# Patient Record
Sex: MALE | Race: WHITE | NOT HISPANIC OR LATINO | URBAN - METROPOLITAN AREA
[De-identification: names, ages, dates, MRNs, and addresses within clinical notes are randomized per-mention and may not be internally consistent; named-entity substitution may affect disease eponyms.]

---

## 2023-08-31 ENCOUNTER — HOSPITAL ENCOUNTER (OUTPATIENT)
Dept: DATA CONVERSION | Facility: HOSPITAL | Age: 68
Discharge: HOME | End: 2023-08-31
Payer: COMMERCIAL

## 2023-08-31 DIAGNOSIS — M47.22 OTHER SPONDYLOSIS WITH RADICULOPATHY, CERVICAL REGION: ICD-10-CM

## 2023-08-31 DIAGNOSIS — M54.12 RADICULOPATHY, CERVICAL REGION: ICD-10-CM

## 2023-08-31 DIAGNOSIS — M47.812 SPONDYLOSIS WITHOUT MYELOPATHY OR RADICULOPATHY, CERVICAL REGION: ICD-10-CM

## 2023-08-31 DIAGNOSIS — Z88.0 ALLERGY STATUS TO PENICILLIN: ICD-10-CM

## 2023-10-31 DIAGNOSIS — M51.36 DDD (DEGENERATIVE DISC DISEASE), LUMBAR: Primary | ICD-10-CM

## 2023-10-31 RX ORDER — GABAPENTIN 300 MG/1
300 CAPSULE ORAL 3 TIMES DAILY
Qty: 270 CAPSULE | Refills: 3 | Status: SHIPPED | OUTPATIENT
Start: 2023-10-31

## 2023-11-13 ENCOUNTER — TELEPHONE (OUTPATIENT)
Dept: PAIN MEDICINE | Facility: CLINIC | Age: 68
End: 2023-11-13
Payer: COMMERCIAL

## 2023-11-13 DIAGNOSIS — M96.1 FAILED BACK SYNDROME OF LUMBAR SPINE: ICD-10-CM

## 2023-11-13 DIAGNOSIS — M46.1 BILATERAL SACROILIITIS (CMS-HCC): ICD-10-CM

## 2023-11-13 RX ORDER — DICLOFENAC SODIUM 10 MG/G
GEL TOPICAL
COMMUNITY
Start: 2022-12-30

## 2023-11-13 RX ORDER — CELECOXIB 200 MG/1
CAPSULE ORAL EVERY 24 HOURS
COMMUNITY

## 2023-11-13 RX ORDER — ATORVASTATIN CALCIUM 20 MG/1
TABLET, FILM COATED ORAL EVERY 24 HOURS
COMMUNITY

## 2023-12-15 ENCOUNTER — HOSPITAL ENCOUNTER (OUTPATIENT)
Dept: RADIOLOGY | Facility: HOSPITAL | Age: 68
Discharge: HOME | End: 2023-12-15
Payer: COMMERCIAL

## 2023-12-15 ENCOUNTER — HOSPITAL ENCOUNTER (OUTPATIENT)
Dept: OPERATING ROOM | Facility: HOSPITAL | Age: 68
Discharge: HOME | End: 2023-12-15
Payer: COMMERCIAL

## 2023-12-15 VITALS
HEART RATE: 70 BPM | WEIGHT: 165 LBS | RESPIRATION RATE: 17 BRPM | SYSTOLIC BLOOD PRESSURE: 126 MMHG | DIASTOLIC BLOOD PRESSURE: 76 MMHG | HEIGHT: 67 IN | BODY MASS INDEX: 25.9 KG/M2 | TEMPERATURE: 97.5 F | OXYGEN SATURATION: 100 %

## 2023-12-15 DIAGNOSIS — M96.1 FAILED BACK SYNDROME OF LUMBAR SPINE: ICD-10-CM

## 2023-12-15 DIAGNOSIS — M46.1 BILATERAL SACROILIITIS (CMS-HCC): ICD-10-CM

## 2023-12-15 PROCEDURE — 76000 FLUOROSCOPY <1 HR PHYS/QHP: CPT

## 2023-12-15 PROCEDURE — 27096 INJECT SACROILIAC JOINT: CPT | Mod: 50 | Performed by: ANESTHESIOLOGY

## 2023-12-15 PROCEDURE — 62323 NJX INTERLAMINAR LMBR/SAC: CPT | Performed by: ANESTHESIOLOGY

## 2023-12-15 PROCEDURE — G0260 INJ FOR SACROILIAC JT ANESTH: HCPCS | Mod: 50 | Performed by: ANESTHESIOLOGY

## 2023-12-15 PROCEDURE — 27096 INJECT SACROILIAC JOINT: CPT | Performed by: ANESTHESIOLOGY

## 2023-12-15 RX ORDER — METOPROLOL SUCCINATE 25 MG/1
25 TABLET, EXTENDED RELEASE ORAL DAILY
COMMUNITY

## 2023-12-15 RX ORDER — LISINOPRIL 20 MG/1
20 TABLET ORAL DAILY
COMMUNITY

## 2023-12-15 RX ORDER — VALACYCLOVIR HYDROCHLORIDE 1 G/1
1000 TABLET, FILM COATED ORAL 2 TIMES DAILY
COMMUNITY

## 2023-12-15 RX ORDER — MIRABEGRON 25 MG/1
25 TABLET, FILM COATED, EXTENDED RELEASE ORAL DAILY
COMMUNITY

## 2023-12-15 RX ORDER — TIZANIDINE HYDROCHLORIDE 4 MG/1
4 CAPSULE, GELATIN COATED ORAL DAILY PRN
COMMUNITY
End: 2024-03-22 | Stop reason: SDUPTHER

## 2023-12-15 RX ORDER — PREDNISOLONE SODIUM PHOSPHATE 10 MG/ML
1 SOLUTION/ DROPS OPHTHALMIC 4 TIMES DAILY
COMMUNITY

## 2023-12-15 ASSESSMENT — ENCOUNTER SYMPTOMS
RESPIRATORY NEGATIVE: 1
EYES NEGATIVE: 1
CONSTITUTIONAL NEGATIVE: 1
BACK PAIN: 1
HEMATOLOGIC/LYMPHATIC NEGATIVE: 1
ARTHRALGIAS: 1
CARDIOVASCULAR NEGATIVE: 1
GASTROINTESTINAL NEGATIVE: 1
ALLERGIC/IMMUNOLOGIC NEGATIVE: 1
PSYCHIATRIC NEGATIVE: 1
NEUROLOGICAL NEGATIVE: 1
ENDOCRINE NEGATIVE: 1

## 2023-12-15 ASSESSMENT — PAIN - FUNCTIONAL ASSESSMENT: PAIN_FUNCTIONAL_ASSESSMENT: 0-10

## 2023-12-15 ASSESSMENT — PAIN SCALES - GENERAL: PAINLEVEL_OUTOF10: 8

## 2023-12-15 ASSESSMENT — COLUMBIA-SUICIDE SEVERITY RATING SCALE - C-SSRS
2. HAVE YOU ACTUALLY HAD ANY THOUGHTS OF KILLING YOURSELF?: NO
6. HAVE YOU EVER DONE ANYTHING, STARTED TO DO ANYTHING, OR PREPARED TO DO ANYTHING TO END YOUR LIFE?: NO
1. IN THE PAST MONTH, HAVE YOU WISHED YOU WERE DEAD OR WISHED YOU COULD GO TO SLEEP AND NOT WAKE UP?: NO

## 2023-12-15 ASSESSMENT — PAIN DESCRIPTION - DESCRIPTORS: DESCRIPTORS: ACHING

## 2023-12-15 NOTE — POST-PROCEDURE NOTE
1236: Pt returned alert and oriented. Pt is not in any distress. Pt states pain is a 5/10. Band aids to back are c/d/I.     1243: Pt is able to stand and ambulate without difficulty. Pt educated on discharge instructions.

## 2023-12-15 NOTE — OP NOTE
Preprocedure diagnosis: Lumbar postlaminectomy syndrome, sacroiliitis  Postprocedure diagnosis postlaminectomy syndrome, sacroiliitis    Procedure performed: Caudal epidural steroid injection, bilateral sacroiliac joint injection under fluoroscopic guidance    Physician: Cesar Rachel MD    Anesthesia: Local    Complications: none    Blood loss:  none    Clinical note: This is a very pleasant 68-year-old male who suffers with low back and leg pain who suffers with here meeting all medical criteria for above-mentioned procedure.    Procedure:   The patient was identified in the preoperative area.  The procedure was discussed in detail including its risks, benefits, and alternatives.  Signed consent was obtained and the patient agreed to proceed.     The patient was brought to the [facility] procedure room and placed in the prone position onto the fluoroscopy table. The lumbosacral area was prepped and draped in the usual sterile fashion using Chloroprep and a fenestrated drape.  Under fluoroscopic guidance in the lateral view the sacrum was imaged and the the intended needle insertion site was marked onto the skin.  The skin was anesthetized with 5ml of 2% lidocaine.  After adequate skin anesthesia was obtained, a 22 gauge spinale needle was inserted through the sacro-coccygeal ligament into the epidural space under fluoroscopic guidance in the AP view.  The needle was aspirated and 1 ml of Omnipaque contrast dye was injected under live fluoroscopy which showed epidural spread.  The needle was then advanced under intermittent fluoroscopic guidance in the AP until the needle tip was between the S2 and S3 neuro-foramen.  The needle was aspirated for heme and csf again, which was negative, and 1 ml of Omnipaque was injected under live fluoroscopy which showed appropriate spread without intrathecal or intravascular uptake.  Then 10 ml of 0.5% preservative free lidocaine and 40 mg of methylprednisolone was  injected.  The needle was removed and a sterile bandage was applied.  Tension was then made to the sacroiliac joints    .  Next the left and right sacroiliac joint/s were identified with the aid of fluoroscopy in the anterior-posterior view.  The patient was prepped and draped in the usual sterile fashion with Chlorprep. The skin and subcutaneous tissues were infiltrated with 3 ml of 2% Lidocaine using a 25 gauge 1 1/2 inch needle on each side. A 22 gauge spinal needle was advanced with the aid of fluoroscopy in the anterior-posterior view into the inferior posterior pole of the left and right sacroiliac joint/s. Needle placement was confirmed with the aid of fluoroscopy in the anterior-posterior view. After negative aspiration, 0.5ml of omnipaque contrast was injected under live fluoroscopy into each needle which showed appropriate spread. The left and right SI joint/s was delineated with the aid of fluoroscopy in the anterior-posterior view.     After negative aspiration, 4ml of 0.5% bupivacaine along with 40mg of methylprednisolone was injected into each needle     All needles were removed intact. Hemostasis was maintained, and there were no paresthesias or complications noted. The area was cleaned and a bandage was applied as appropriate.  The patient was then transferred to the recovery area. The procedure was completed without complications and was tolerated well. The patient was monitored after the procedure. The patient (or responsible party) was given post-procedure and discharge instructions to follow at home. The patient was discharged in stable condition. A follow up appointment was made.          Cesar Rachel MD

## 2023-12-15 NOTE — H&P
History Of Present Illness  Johnny Epstein is a 68 y.o. male presenting with low back pain.     Past Medical History  No past medical history on file.    Surgical History  Past Surgical History:   Procedure Laterality Date    CT GUIDED PERCUTANEOUS BIOPSY BONE  12/30/2022    CT GUIDED PERCUTANEOUS BIOPSY BONE 12/30/2022        Social History  He has no history on file for tobacco use, alcohol use, and drug use.    Family History  No family history on file.     Allergies  Penicillin v and Penicillin    Review of Systems   Constitutional: Negative.    HENT: Negative.     Eyes: Negative.    Respiratory: Negative.     Cardiovascular: Negative.    Gastrointestinal: Negative.    Endocrine: Negative.    Genitourinary: Negative.    Musculoskeletal:  Positive for arthralgias and back pain.   Skin: Negative.    Allergic/Immunologic: Negative.    Neurological: Negative.    Hematological: Negative.    Psychiatric/Behavioral: Negative.          Physical Exam  Vitals and nursing note reviewed.   Constitutional:       Appearance: Normal appearance.   HENT:      Head: Normocephalic and atraumatic.      Right Ear: Ear canal and external ear normal.      Left Ear: Ear canal and external ear normal.      Nose: Nose normal.      Mouth/Throat:      Mouth: Mucous membranes are moist.      Pharynx: Oropharynx is clear.   Eyes:      Conjunctiva/sclera: Conjunctivae normal.      Pupils: Pupils are equal, round, and reactive to light.   Cardiovascular:      Rate and Rhythm: Normal rate.   Pulmonary:      Effort: Pulmonary effort is normal. No respiratory distress.   Musculoskeletal:      Cervical back: Normal range of motion and neck supple.      Lumbar back: Tenderness present. Decreased range of motion.        Back:       Comments: Bilateral SI joint provocation bilaterally   Skin:     General: Skin is warm and dry.   Neurological:      Mental Status: He is alert.      Motor: Motor function is intact.      Coordination: Coordination is  intact.      Gait: Gait is intact.   Psychiatric:         Mood and Affect: Mood normal.         Thought Content: Thought content normal.          Last Recorded Vitals  There were no vitals taken for this visit.    Relevant Results             Assessment/Plan   Active Problems:  There are no active Hospital Problems.      Patient here today for caudal epidural steroid injection and bilateral sacroiliac joint injections.  Risks, benefit, and alternatives of the procedure were discussed with the patient.  Oswestry score has been compelted and recorded.       I spent 5 minutes in the professional and overall care of this patient.      Cesar Rachel MD

## 2023-12-15 NOTE — DISCHARGE INSTRUCTIONS
DISCHARGEINSTRUCTIONS FOR INJECTIONS     You underwent Caudal Epidural and Sacroiliac Joint Injection today    Aftermost injections, it is recommended that you relax and limit your activity for the remainder of the day unless you have been told otherwise by your pain physician.  You should not drive a car, operate machinery, or make important legal decisions unless otherwise directed by your pain physician.  You may resume your normal activity, including exercise, tomorrow.      Keep a written pain diary of how much pain relief you experienced following the injection procedure and the length of time of pain relief you experienced pain relief. Following diagnostic injections like medial branch nerve blocks, sacroiliac joint blocks, stellate ganglion injections and other blocks, it is very important you record the specific amount of pain relief you experienced immediately after the injectionand how long it lasted. Your doctor will ask you for this information at your follow up visit.     For all injections, please keep the injection site dry and inspect the site for a couple of days. You may remove the Band-Aid the day of the injection at any time.     Some discomfort, bruising or slight swelling may occur at the injection site. This is not abnormal if it occurs.  If needed you may:    -Take over the counter medication such as Tylenol or Motrin.   -Apply an ice pack for 30 minutes, 2 to 3 times a day for the first 24 hours.     You may shower today; no soaking baths, hot tubs, whirlpools or swimming pools for two days.      If you are given steroids in your injection, it may take 3-5 days for the steroid medication to take effect. You may notice a worsening of your symptoms for 1-2 days after the injection. This is not abnormal.  You may use acetaminophen, ibuprofen, or prescription medication that your doctor may have prescribed for you if you need to do so.     A few common side effects of steroids include facial  flushing, sweating, restlessness, irritability,difficulty sleeping, increase in blood sugar, and increased blood pressure. If you have diabetes, please monitor your blood sugar at least once a day for at least 5 days. If you have poorly controlled high blood pressure, monitoryour blood pressure for at least 2 days and contact your primary care physician if these numbers are unusually high for you.      If you take aspirin or non-steroidal anti-inflammatory drugs (examples are Motrin, Advil, ibuprofen, Naprosyn, Voltaren, Relafen, etc.) you may restart these this evening, but stop taking it 3 days before your next appointment, unless instructed otherwiseby your physician.      You do not need to discontinue non-aspirin-containing pain medications prior to an injection (examples: Celebrex, tramadol, hydrocodone and acetaminophen).      If you take a blood thinning medication (Coumadin, Lovenox, Fragmin,Ticlid, Plavix, Pradaxa, etc.), please discuss this with your primary care physician/cardiologist and your pain physician. These medications MUST be discontinued before you can have an injection safely, without the risk of uncontrolled bleeding. If these medications are not discontinued for an appropriate period of time, you will not be able to receivean injection.      If you are taking Coumadin, please have your INR checked the morning of your procedure and bringthe result to your appointment unless otherwise instructed. If your INR is over 1.2, your injection may need to be rescheduled to avoid uncontrolled bleeding from the needle placement.     Call [facility] at [phone] between 8am-4pm Monday - Friday if you are experiencing the following:    If you received an epidural or spinal injection:    -Headache that doesnot go away with medicine, is worse when sitting or standing up, and is greatly relieved upon lying down.   -Severe pain worse than or different than your baseline pain.   -Chills  or fever (101º F or greater).   -Drainage or signs of infection at the injection site     Go directly to the Emergency Department if you are experiencing the following and received an epidural or spinal injection:   -Abrupt weakness or progressive weakness in your legs that starts after you leave the clinic.   -Abrupt severe or worsening numbness in your legs.   -Inability to urinate after the injection or loss of bowel or bladder control without the urge to defecate or urinate.     If you have a clinical question that cannot wait until your next appointment, please call [phone] between 8am-4pm Monday - Friday or send a [employer] message. We do our best to return all non-emergency messages within 24 hours, Monday - Friday. A nurse or physician will return your message.      If you need to cancel an appointment, please call the scheduling staff at [phone] during normal business hours or leave a message at least 24 hours in advance.     If you are going to be sedated for your next procedure, you MUST have responsible adult who can legally drive accompany you home. You cannot eat or drink for eight hours prior to the planned procedure if you are going to receive sedation. You may take your non-blood thinning medications with a small sip of water.

## 2024-03-22 DIAGNOSIS — M96.1 FAILED BACK SYNDROME OF LUMBAR SPINE: Primary | ICD-10-CM

## 2024-03-22 RX ORDER — TIZANIDINE HYDROCHLORIDE 4 MG/1
4 CAPSULE, GELATIN COATED ORAL DAILY PRN
Qty: 90 CAPSULE | Refills: 0 | Status: SHIPPED | OUTPATIENT
Start: 2024-03-22 | End: 2024-04-02 | Stop reason: ALTCHOICE

## 2024-03-22 NOTE — TELEPHONE ENCOUNTER
Patient called requesting a refill of his tizanidine, last seen for a procedure 12/15//23.  No follow up scheduled.

## 2024-03-27 ENCOUNTER — TELEPHONE (OUTPATIENT)
Dept: PAIN MEDICINE | Facility: CLINIC | Age: 69
End: 2024-03-27
Payer: COMMERCIAL

## 2024-03-27 NOTE — TELEPHONE ENCOUNTER
Phone call with [facility]. Last Tizanidine script was sent at 1 capsule daily as needed. Previous order was 1 tablet TID as needed. Capsules and tablets are not equivalent per pharmacy. Please clarify if this is to be filled.

## 2024-05-28 DIAGNOSIS — M46.1 BILATERAL SACROILIITIS (CMS-HCC): ICD-10-CM

## 2024-06-21 ENCOUNTER — HOSPITAL ENCOUNTER (OUTPATIENT)
Dept: RADIOLOGY | Facility: HOSPITAL | Age: 69
Discharge: HOME | End: 2024-06-21
Payer: COMMERCIAL

## 2024-06-21 ENCOUNTER — HOSPITAL ENCOUNTER (OUTPATIENT)
Dept: OPERATING ROOM | Facility: HOSPITAL | Age: 69
Discharge: HOME | End: 2024-06-21
Payer: COMMERCIAL

## 2024-06-21 VITALS
DIASTOLIC BLOOD PRESSURE: 67 MMHG | HEART RATE: 58 BPM | OXYGEN SATURATION: 100 % | TEMPERATURE: 96.8 F | SYSTOLIC BLOOD PRESSURE: 126 MMHG | HEIGHT: 67 IN | RESPIRATION RATE: 16 BRPM | BODY MASS INDEX: 26.06 KG/M2 | WEIGHT: 166 LBS

## 2024-06-21 DIAGNOSIS — M46.1 BILATERAL SACROILIITIS (CMS-HCC): ICD-10-CM

## 2024-06-21 PROCEDURE — 2500000004 HC RX 250 GENERAL PHARMACY W/ HCPCS (ALT 636 FOR OP/ED): Performed by: ANESTHESIOLOGY

## 2024-06-21 PROCEDURE — 3600000001 HC OR TIME - INITIAL BASE CHARGE - PROCEDURE LEVEL ONE

## 2024-06-21 PROCEDURE — 7100000010 HC PHASE TWO TIME - EACH INCREMENTAL 1 MINUTE

## 2024-06-21 PROCEDURE — 2500000005 HC RX 250 GENERAL PHARMACY W/O HCPCS: Performed by: ANESTHESIOLOGY

## 2024-06-21 PROCEDURE — 2550000001 HC RX 255 CONTRASTS: Performed by: ANESTHESIOLOGY

## 2024-06-21 PROCEDURE — 7100000009 HC PHASE TWO TIME - INITIAL BASE CHARGE

## 2024-06-21 PROCEDURE — 3600000006 HC OR TIME - EACH INCREMENTAL 1 MINUTE - PROCEDURE LEVEL ONE

## 2024-06-21 RX ORDER — LIDOCAINE HYDROCHLORIDE 20 MG/ML
INJECTION, SOLUTION EPIDURAL; INFILTRATION; INTRACAUDAL; PERINEURAL AS NEEDED
Status: COMPLETED | OUTPATIENT
Start: 2024-06-21 | End: 2024-06-21

## 2024-06-21 RX ORDER — BUPIVACAINE HYDROCHLORIDE 5 MG/ML
INJECTION, SOLUTION PERINEURAL AS NEEDED
Status: COMPLETED | OUTPATIENT
Start: 2024-06-21 | End: 2024-06-21

## 2024-06-21 RX ORDER — TRIAMCINOLONE ACETONIDE 40 MG/ML
INJECTION, SUSPENSION INTRA-ARTICULAR; INTRAMUSCULAR AS NEEDED
Status: COMPLETED | OUTPATIENT
Start: 2024-06-21 | End: 2024-06-21

## 2024-06-21 ASSESSMENT — ENCOUNTER SYMPTOMS
BLOOD IN STOOL: 0
UNEXPECTED WEIGHT CHANGE: 0
ABDOMINAL DISTENTION: 0
DEPRESSION: 0
OCCASIONAL FEELINGS OF UNSTEADINESS: 0
TROUBLE SWALLOWING: 0
VOMITING: 0
CONSTIPATION: 0
CHILLS: 0
RECTAL PAIN: 0
NECK PAIN: 1
COLOR CHANGE: 0
SHORTNESS OF BREATH: 0
LOSS OF SENSATION IN FEET: 1
ANAL BLEEDING: 0
BACK PAIN: 1
ABDOMINAL PAIN: 0
FEVER: 0
DIARRHEA: 0
NAUSEA: 0

## 2024-06-21 ASSESSMENT — PAIN - FUNCTIONAL ASSESSMENT
PAIN_FUNCTIONAL_ASSESSMENT: 0-10
PAIN_FUNCTIONAL_ASSESSMENT: 0-10

## 2024-06-21 ASSESSMENT — PAIN DESCRIPTION - DESCRIPTORS
DESCRIPTORS: DULL;ACHING
DESCRIPTORS: ACHING

## 2024-06-21 ASSESSMENT — PAIN SCALES - GENERAL
PAINLEVEL_OUTOF10: 8
PAINLEVEL_OUTOF10: 5 - MODERATE PAIN

## 2024-06-21 NOTE — POST-PROCEDURE NOTE
1129 Patient states he feels well. Denies dizziness, heaache, dyspnea and nausea. Requests cookies and gingerale because he is hungry.   1140 Discussed AVS with patient including signs and symptoms to report to doctor and emergency symptoms to go to ED for. Patient states understanding and denies having questions about discharge instructions States he has had this procedure many times before. Understands not to use a heating pad over injection sites but may use ice pack. Provided paper copy of instructions to go home with. States his son is in waiting room and will drive him home.   1145 2 bandaids noted over tail bone area, clean and dry with no evidence of bleeding or drainage. Denies numbness and tingling in lower extremities. Able to sit on edge of bed independently.   1149 Patient standing, states he does not have any abnormal numbess or tingling in lower extremities. Ambulating independently with even, steady gait. Drinking ginerale and eating cookies without difficulty.   1150 Ambulating off unit at thistime with PAULINO Moreno RN. Patient ambulating with even, steady gait.

## 2024-06-21 NOTE — H&P
History Of Present Illness  Johnny Epstein is a 68 y.o. male presenting with low back pain.     Past Medical History  History reviewed. No pertinent past medical history.    Surgical History  Past Surgical History:   Procedure Laterality Date    CT GUIDED PERCUTANEOUS BIOPSY BONE  12/30/2022    CT GUIDED PERCUTANEOUS BIOPSY BONE 12/30/2022        Social History  He reports that he has never smoked. He has never been exposed to tobacco smoke. He has never used smokeless tobacco. He reports that he does not drink alcohol and does not use drugs.    Family History  No family history on file.     Allergies  Penicillin v and Penicillin    Review of Systems   Constitutional:  Negative for chills, fever and unexpected weight change.   HENT:  Negative for trouble swallowing.    Respiratory:  Negative for shortness of breath.    Cardiovascular:  Negative for chest pain.   Gastrointestinal:  Negative for abdominal distention, abdominal pain, anal bleeding, blood in stool, constipation, diarrhea, nausea, rectal pain and vomiting.   Musculoskeletal:  Positive for back pain and neck pain.   Skin:  Negative for color change.        Physical Exam  Constitutional:       General: He is awake.      Appearance: Normal appearance. He is normal weight.   HENT:      Head: Normocephalic and atraumatic.      Nose: Nose normal.      Mouth/Throat:      Mouth: Mucous membranes are moist.      Pharynx: Oropharynx is clear.   Eyes:      Conjunctiva/sclera: Conjunctivae normal.      Pupils: Pupils are equal, round, and reactive to light.   Cardiovascular:      Rate and Rhythm: Normal rate.      Pulses: Normal pulses.   Pulmonary:      Effort: Pulmonary effort is normal. No respiratory distress.   Skin:     General: Skin is warm and dry.   Neurological:      Mental Status: He is alert and oriented to person, place, and time. Mental status is at baseline.   Psychiatric:         Attention and Perception: Attention and perception normal.          "Mood and Affect: Mood normal.         Behavior: Behavior normal.          Last Recorded Vitals  Pulse 61, temperature 36 °C (96.8 °F), temperature source Temporal, resp. rate 16, height 1.702 m (5' 7\"), weight 75.3 kg (166 lb), SpO2 100%.    Relevant Results             Assessment/Plan   Active Problems:  There are no active Hospital Problems.      Patient here today for bilateral sacroiliac joint injections under fluoroscopic guidance.  Risks, benefit, and alternatives of the procedure were discussed with the patient.  Oswestry score has been compelted and recorded.       I spent 5 minutes in the professional and overall care of this patient.      Cesar Rachel MD    "

## 2024-08-10 DIAGNOSIS — M51.36 DDD (DEGENERATIVE DISC DISEASE), LUMBAR: ICD-10-CM

## 2024-08-12 RX ORDER — GABAPENTIN 300 MG/1
300 CAPSULE ORAL 3 TIMES DAILY
Qty: 270 CAPSULE | Refills: 3 | Status: SHIPPED | OUTPATIENT
Start: 2024-08-12

## 2024-08-27 ENCOUNTER — TELEPHONE (OUTPATIENT)
Dept: PAIN MEDICINE | Facility: CLINIC | Age: 69
End: 2024-08-27
Payer: COMMERCIAL

## 2024-08-27 ENCOUNTER — TELEMEDICINE (OUTPATIENT)
Dept: PAIN MEDICINE | Facility: CLINIC | Age: 69
End: 2024-08-27
Payer: COMMERCIAL

## 2024-08-27 DIAGNOSIS — M96.1 POSTLAMINECTOMY SYNDROME, LUMBAR REGION: Primary | ICD-10-CM

## 2024-08-27 DIAGNOSIS — M46.1 SACROILIITIS (CMS-HCC): ICD-10-CM

## 2024-08-27 DIAGNOSIS — M51.36 DDD (DEGENERATIVE DISC DISEASE), LUMBAR: ICD-10-CM

## 2024-08-27 DIAGNOSIS — M43.06 LUMBAR SPONDYLOLYSIS: ICD-10-CM

## 2024-08-27 PROCEDURE — 1125F AMNT PAIN NOTED PAIN PRSNT: CPT | Performed by: ANESTHESIOLOGY

## 2024-08-27 PROCEDURE — 99213 OFFICE O/P EST LOW 20 MIN: CPT | Performed by: ANESTHESIOLOGY

## 2024-08-27 PROCEDURE — 1160F RVW MEDS BY RX/DR IN RCRD: CPT | Performed by: ANESTHESIOLOGY

## 2024-08-27 PROCEDURE — 1159F MED LIST DOCD IN RCRD: CPT | Performed by: ANESTHESIOLOGY

## 2024-08-27 RX ORDER — TIZANIDINE 4 MG/1
4 TABLET ORAL 3 TIMES DAILY PRN
Qty: 270 TABLET | Refills: 0 | Status: SHIPPED | OUTPATIENT
Start: 2024-08-27

## 2024-08-27 RX ORDER — GABAPENTIN 300 MG/1
300 CAPSULE ORAL 3 TIMES DAILY
Qty: 270 CAPSULE | Refills: 3 | Status: SHIPPED | OUTPATIENT
Start: 2024-08-27

## 2024-08-27 ASSESSMENT — PAIN - FUNCTIONAL ASSESSMENT: PAIN_FUNCTIONAL_ASSESSMENT: 0-10

## 2024-08-27 ASSESSMENT — ENCOUNTER SYMPTOMS
EYES NEGATIVE: 1
HEMATOLOGIC/LYMPHATIC NEGATIVE: 1
RESPIRATORY NEGATIVE: 1
ENDOCRINE NEGATIVE: 1
MYALGIAS: 1
PSYCHIATRIC NEGATIVE: 1
CONSTITUTIONAL NEGATIVE: 1
GASTROINTESTINAL NEGATIVE: 1
ALLERGIC/IMMUNOLOGIC NEGATIVE: 1
BACK PAIN: 1
CARDIOVASCULAR NEGATIVE: 1

## 2024-08-27 ASSESSMENT — PAIN DESCRIPTION - DESCRIPTORS: DESCRIPTORS: ACHING

## 2024-08-27 ASSESSMENT — PATIENT HEALTH QUESTIONNAIRE - PHQ9
SUM OF ALL RESPONSES TO PHQ9 QUESTIONS 1 AND 2: 0
2. FEELING DOWN, DEPRESSED OR HOPELESS: NOT AT ALL
1. LITTLE INTEREST OR PLEASURE IN DOING THINGS: NOT AT ALL

## 2024-08-27 ASSESSMENT — PAIN SCALES - GENERAL
PAINLEVEL: 8
PAINLEVEL_OUTOF10: 8

## 2024-08-27 NOTE — PROGRESS NOTES
Subjective   Patient ID: Johnny Epstein is a 68 y.o. male who presents for Back Pain.  Back Pain    Patient here today for follow up from his si joint injection and caudal ANGELINE.  Patient reports he saw a 80% reducation in his pain from the procedure and has been feeling great.   However, he will still get back pain with prolonged activities.  His last MRI was 12/2023.  He has had surgery with [facility] in the past.  He quesitons of what other options there maybe for him.      He does need refill of his gabapentin and zanaflex today.     Review of Systems   Constitutional: Negative.    HENT: Negative.     Eyes: Negative.    Respiratory: Negative.     Cardiovascular: Negative.    Gastrointestinal: Negative.    Endocrine: Negative.    Genitourinary: Negative.    Musculoskeletal:  Positive for back pain, gait problem and myalgias.   Skin: Negative.    Allergic/Immunologic: Negative.    Hematological: Negative.    Psychiatric/Behavioral: Negative.         Objective   Physical Exam  Nursing note reviewed.   Constitutional:       Appearance: Normal appearance.   Neurological:      Mental Status: He is alert.         Assessment/Plan   Problem List Items Addressed This Visit    None  Visit Diagnoses         Codes    Postlaminectomy syndrome, lumbar region    -  Primary M96.1    Sacroiliitis (CMS-[facility])     M46.1    DDD (degenerative disc disease), lumbar     M51.36    Relevant Medications    gabapentin (Neurontin) 300 mg capsule    Lumbar spondylolysis     M43.06    Relevant Medications    tiZANidine (Zanaflex) 4 mg tablet             I nice discussion with the patient today our plan will be as follows.    Radiology: All imaging has been reviewed today.     Physically:  Cotninue with home exercise program.     Psychologically:  no issues at this time.     Medication: Gabapentin and Zanaflex refilled today    Duration:  > 2 years    Intervention:  Patient has seen good benefit with caudal and BL si Joint injections  reporting > 80% relief.      We did discuss SCS as a potiential option for his back and leg pain after his fusion surgery.  I will send educational material to his home and invite him to see me for a visit to discuss.         Cesar Rachel MD 08/27/24 4:01 PM

## 2024-08-30 ENCOUNTER — PREP FOR PROCEDURE (OUTPATIENT)
Dept: OPERATING ROOM | Facility: HOSPITAL | Age: 69
End: 2024-08-30
Payer: COMMERCIAL

## 2024-08-30 DIAGNOSIS — M96.1 FAILED BACK SYNDROME OF LUMBAR SPINE: Primary | ICD-10-CM

## 2024-08-30 RX ORDER — SODIUM CHLORIDE, SODIUM LACTATE, POTASSIUM CHLORIDE, CALCIUM CHLORIDE 600; 310; 30; 20 MG/100ML; MG/100ML; MG/100ML; MG/100ML
20 INJECTION, SOLUTION INTRAVENOUS CONTINUOUS
OUTPATIENT
Start: 2024-08-30

## 2024-09-13 ENCOUNTER — TELEPHONE (OUTPATIENT)
Dept: PAIN MEDICINE | Facility: CLINIC | Age: 69
End: 2024-09-13
Payer: COMMERCIAL

## 2024-10-04 ENCOUNTER — HOSPITAL ENCOUNTER (OUTPATIENT)
Dept: OPERATING ROOM | Facility: HOSPITAL | Age: 69
Discharge: HOME | End: 2024-10-04
Payer: COMMERCIAL

## 2024-10-04 VITALS
RESPIRATION RATE: 14 BRPM | HEART RATE: 52 BPM | DIASTOLIC BLOOD PRESSURE: 60 MMHG | SYSTOLIC BLOOD PRESSURE: 106 MMHG | HEIGHT: 69 IN | OXYGEN SATURATION: 100 % | TEMPERATURE: 97.5 F | BODY MASS INDEX: 23.85 KG/M2 | WEIGHT: 161 LBS

## 2024-10-04 DIAGNOSIS — M96.1 FAILED BACK SYNDROME OF LUMBAR SPINE: ICD-10-CM

## 2024-10-04 PROCEDURE — 7100000009 HC PHASE TWO TIME - INITIAL BASE CHARGE

## 2024-10-04 PROCEDURE — 62323 NJX INTERLAMINAR LMBR/SAC: CPT | Performed by: ANESTHESIOLOGY

## 2024-10-04 PROCEDURE — 3600000001 HC OR TIME - INITIAL BASE CHARGE - PROCEDURE LEVEL ONE

## 2024-10-04 PROCEDURE — 2500000004 HC RX 250 GENERAL PHARMACY W/ HCPCS (ALT 636 FOR OP/ED): Performed by: ANESTHESIOLOGY

## 2024-10-04 PROCEDURE — 3600000006 HC OR TIME - EACH INCREMENTAL 1 MINUTE - PROCEDURE LEVEL ONE

## 2024-10-04 PROCEDURE — 7100000010 HC PHASE TWO TIME - EACH INCREMENTAL 1 MINUTE

## 2024-10-04 PROCEDURE — 2550000001 HC RX 255 CONTRASTS: Performed by: ANESTHESIOLOGY

## 2024-10-04 RX ORDER — TRIAMCINOLONE ACETONIDE 40 MG/ML
INJECTION, SUSPENSION INTRA-ARTICULAR; INTRAMUSCULAR AS NEEDED
Status: COMPLETED | OUTPATIENT
Start: 2024-10-04 | End: 2024-10-04

## 2024-10-04 RX ORDER — LIDOCAINE HYDROCHLORIDE 20 MG/ML
INJECTION, SOLUTION EPIDURAL; INFILTRATION; INTRACAUDAL; PERINEURAL AS NEEDED
Status: COMPLETED | OUTPATIENT
Start: 2024-10-04 | End: 2024-10-04

## 2024-10-04 RX ORDER — LIDOCAINE HYDROCHLORIDE 5 MG/ML
INJECTION, SOLUTION INFILTRATION; INTRAVENOUS AS NEEDED
Status: COMPLETED | OUTPATIENT
Start: 2024-10-04 | End: 2024-10-04

## 2024-10-04 ASSESSMENT — ENCOUNTER SYMPTOMS
DIARRHEA: 0
SHORTNESS OF BREATH: 0
CONSTITUTIONAL NEGATIVE: 1
BACK PAIN: 1
CHILLS: 0
JOINT SWELLING: 0
CONSTIPATION: 0
OCCASIONAL FEELINGS OF UNSTEADINESS: 0
DEPRESSION: 0
SLEEP DISTURBANCE: 0
HEADACHES: 0
NAUSEA: 0
ARTHRALGIAS: 0
NEUROLOGICAL NEGATIVE: 1
ABDOMINAL DISTENTION: 0
RESPIRATORY NEGATIVE: 1
DIZZINESS: 0
COLOR CHANGE: 0
ALLERGIC/IMMUNOLOGIC NEGATIVE: 1
GASTROINTESTINAL NEGATIVE: 1
LOSS OF SENSATION IN FEET: 1
UNEXPECTED WEIGHT CHANGE: 0
SPEECH DIFFICULTY: 0
ABDOMINAL PAIN: 0
EYES NEGATIVE: 1
LIGHT-HEADEDNESS: 0
CARDIOVASCULAR NEGATIVE: 1
PSYCHIATRIC NEGATIVE: 1
COUGH: 0
TROUBLE SWALLOWING: 0
DIFFICULTY URINATING: 0
VOMITING: 0
HEMATOLOGIC/LYMPHATIC NEGATIVE: 1
CONFUSION: 0
ENDOCRINE NEGATIVE: 1
WHEEZING: 0
FEVER: 0

## 2024-10-04 ASSESSMENT — PAIN DESCRIPTION - DESCRIPTORS: DESCRIPTORS: SHARP;PRESSURE

## 2024-10-04 ASSESSMENT — PAIN SCALES - GENERAL
PAINLEVEL_OUTOF10: 6
PAINLEVEL_OUTOF10: 9

## 2024-10-04 ASSESSMENT — PAIN - FUNCTIONAL ASSESSMENT
PAIN_FUNCTIONAL_ASSESSMENT: 0-10
PAIN_FUNCTIONAL_ASSESSMENT: 0-10

## 2024-10-04 ASSESSMENT — COLUMBIA-SUICIDE SEVERITY RATING SCALE - C-SSRS
1. IN THE PAST MONTH, HAVE YOU WISHED YOU WERE DEAD OR WISHED YOU COULD GO TO SLEEP AND NOT WAKE UP?: NO
6. HAVE YOU EVER DONE ANYTHING, STARTED TO DO ANYTHING, OR PREPARED TO DO ANYTHING TO END YOUR LIFE?: NO
2. HAVE YOU ACTUALLY HAD ANY THOUGHTS OF KILLING YOURSELF?: NO

## 2024-10-04 NOTE — DISCHARGE INSTRUCTIONS
DISCHARGE INSTRUCTIONS FOR INJECTIONS     You underwent caudal epidural today    Aftermost injections, it is recommended that you relax and limit your activity for the remainder of the day unless you have been told otherwise by your pain physician.  You should not drive a car, operate machinery, or make important legal decisions unless otherwise directed by your pain physician.  You may resume your normal activity, including exercise, tomorrow.      Keep a written pain diary of how much pain relief you experienced following the injection procedure and the length of time of pain relief you experienced pain relief. Following diagnostic injections like medial branch nerve blocks, sacroiliac joint blocks, stellate ganglion injections and other blocks, it is very important you record the specific amount of pain relief you experienced immediately after the injectionand how long it lasted. Your doctor will ask you for this information at your follow up visit.     For all injections, please keep the injection site dry and inspect the site for a couple of days. You may remove the Band-Aid the day of the injection at any time.     Some discomfort, bruising or slight swelling may occur at the injection site. This is not abnormal if it occurs.  If needed you may:    -Take over the counter medication such as Tylenol or Motrin.   -Apply an ice pack for 30 minutes, 2 to 3 times a day for the first 24 hours.     You may shower today; no soaking baths, hot tubs, whirlpools or swimming pools for two days.      If you are given steroids in your injection, it may take 3-5 days for the steroid medication to take effect. You may notice a worsening of your symptoms for 1-2 days after the injection. This is not abnormal.  You may use acetaminophen, ibuprofen, or prescription medication that your doctor may have prescribed for you if you need to do so.     A few common side effects of steroids include facial flushing, sweating,  restlessness, irritability,difficulty sleeping, increase in blood sugar, and increased blood pressure. If you have diabetes, please monitor your blood sugar at least once a day for at least 5 days. If you have poorly controlled high blood pressure, monitoryour blood pressure for at least 2 days and contact your primary care physician if these numbers are unusually high for you.      If you take aspirin or non-steroidal anti-inflammatory drugs (examples are Motrin, Advil, ibuprofen, Naprosyn, Voltaren, Relafen, etc.) you may restart these this evening, but stop taking it 3 days before your next appointment, unless instructed otherwiseby your physician.      You do not need to discontinue non-aspirin-containing pain medications prior to an injection (examples: Celebrex, tramadol, hydrocodone and acetaminophen).      If you take a blood thinning medication (Coumadin, Lovenox, Fragmin,Ticlid, Plavix, Pradaxa, etc.), please discuss this with your primary care physician/cardiologist and your pain physician. These medications MUST be discontinued before you can have an injection safely, without the risk of uncontrolled bleeding. If these medications are not discontinued for an appropriate period of time, you will not be able to receivean injection.      If you are taking Coumadin, please have your INR checked the morning of your procedure and bringthe result to your appointment unless otherwise instructed. If your INR is over 1.2, your injection may need to be rescheduled to avoid uncontrolled bleeding from the needle placement.     Call [facility] at [phone] between 8am-4pm Monday - Friday if you are experiencing the following:    If you received an epidural or spinal injection:    -Headache that doesnot go away with medicine, is worse when sitting or standing up, and is greatly relieved upon lying down.   -Severe pain worse than or different than your baseline pain.   -Chills or fever (101º F or  greater).   -Drainage or signs of infection at the injection site     Go directly to the Emergency Department if you are experiencing the following and received an epidural or spinal injection:   -Abrupt weakness or progressive weakness in your legs that starts after you leave the clinic.   -Abrupt severe or worsening numbness in your legs.   -Inability to urinate after the injection or loss of bowel or bladder control without the urge to defecate or urinate.     If you have a clinical question that cannot wait until your next appointment, please call [phone] between 8am-4pm Monday - Friday or send a [employer] message. We do our best to return all non-emergency messages within 24 hours, Monday - Friday. A nurse or physician will return your message.      If you need to cancel an appointment, please call the scheduling staff at [phone] during normal business hours or leave a message at least 24 hours in advance.     If you are going to be sedated for your next procedure, you MUST have responsible adult who can legally drive accompany you home. You cannot eat or drink for eight hours prior to the planned procedure if you are going to receive sedation. You may take your non-blood thinning medications with a small sip of water.

## 2024-10-04 NOTE — H&P
History Of Present Illness  Johnny Epstein is a 69 y.o. male presenting with low back and leg pain.     Past Medical History  No past medical history on file.    Surgical History  Past Surgical History:   Procedure Laterality Date    CT GUIDED PERCUTANEOUS BIOPSY BONE  12/30/2022    CT GUIDED PERCUTANEOUS BIOPSY BONE 12/30/2022        Social History  He reports that he has never smoked. He has never been exposed to tobacco smoke. He has never used smokeless tobacco. He reports that he does not drink alcohol and does not use drugs.    Family History  No family history on file.     Allergies  Penicillin v and Penicillin    Review of Systems   Constitutional: Negative.  Negative for chills, fever and unexpected weight change.   HENT: Negative.  Negative for congestion and trouble swallowing.    Eyes: Negative.    Respiratory: Negative.  Negative for cough, shortness of breath and wheezing.    Cardiovascular: Negative.  Negative for chest pain.   Gastrointestinal: Negative.  Negative for abdominal distention, abdominal pain, constipation, diarrhea, nausea and vomiting.   Endocrine: Negative.    Genitourinary: Negative.  Negative for difficulty urinating.   Musculoskeletal:  Positive for back pain. Negative for arthralgias and joint swelling.   Skin: Negative.  Negative for color change.   Allergic/Immunologic: Negative.    Neurological: Negative.  Negative for dizziness, speech difficulty, light-headedness and headaches.   Hematological: Negative.    Psychiatric/Behavioral: Negative.  Negative for confusion and sleep disturbance.         Physical Exam  Constitutional:       Appearance: Normal appearance. He is normal weight.   HENT:      Head: Normocephalic and atraumatic.      Nose: Nose normal.      Mouth/Throat:      Mouth: Mucous membranes are moist.      Pharynx: Oropharynx is clear.   Eyes:      Conjunctiva/sclera: Conjunctivae normal.      Pupils: Pupils are equal, round, and reactive to light.   Cardiovascular:  "     Rate and Rhythm: Normal rate.      Pulses: Normal pulses.   Pulmonary:      Effort: Pulmonary effort is normal. No respiratory distress.   Musculoskeletal:      Lumbar back: Tenderness present. Decreased range of motion.   Skin:     General: Skin is warm and dry.   Neurological:      Mental Status: He is alert.   Psychiatric:         Mood and Affect: Mood normal.          Last Recorded Vitals  Blood pressure 142/81, pulse 59, temperature 36.4 °C (97.5 °F), temperature source Temporal, resp. rate 17, height 1.753 m (5' 9\"), weight 73 kg (161 lb), SpO2 99%.    Relevant Results             Assessment/Plan   Assessment & Plan  Failed back syndrome of lumbar spine      Patient here today for caudal epidural steroid injection under fluoroscopic guidance.       I spent 5 minutes in the professional and overall care of this patient.      Cesar Rachel MD    "

## 2024-10-04 NOTE — POST-PROCEDURE NOTE
1305- pt brought back from pacu, verbal report received. Pt is alert and awake. Discharge instructions given and explained. Pt denies any new weakness, n/t to extremities. Moving all extremities well.    1315- pt sitting up at edge of bed having cookies and ginger ale.

## 2024-10-22 ENCOUNTER — PREP FOR PROCEDURE (OUTPATIENT)
Dept: PAIN MEDICINE | Facility: CLINIC | Age: 69
End: 2024-10-22
Payer: COMMERCIAL

## 2024-10-22 DIAGNOSIS — M46.1 SACROILIITIS (CMS-HCC): Primary | ICD-10-CM

## 2024-10-24 ENCOUNTER — OFFICE VISIT (OUTPATIENT)
Dept: ORTHOPEDIC SURGERY | Facility: HOSPITAL | Age: 69
End: 2024-10-24
Payer: COMMERCIAL

## 2024-10-24 ENCOUNTER — HOSPITAL ENCOUNTER (OUTPATIENT)
Dept: RADIOLOGY | Facility: HOSPITAL | Age: 69
Discharge: HOME | End: 2024-10-24
Payer: COMMERCIAL

## 2024-10-24 DIAGNOSIS — M19.041 PRIMARY OSTEOARTHRITIS OF RIGHT HAND: Primary | ICD-10-CM

## 2024-10-24 DIAGNOSIS — M79.641 RIGHT HAND PAIN: ICD-10-CM

## 2024-10-24 PROCEDURE — 99203 OFFICE O/P NEW LOW 30 MIN: CPT | Performed by: ORTHOPAEDIC SURGERY

## 2024-10-24 PROCEDURE — 1125F AMNT PAIN NOTED PAIN PRSNT: CPT | Performed by: ORTHOPAEDIC SURGERY

## 2024-10-24 PROCEDURE — 1036F TOBACCO NON-USER: CPT | Performed by: ORTHOPAEDIC SURGERY

## 2024-10-24 PROCEDURE — 99213 OFFICE O/P EST LOW 20 MIN: CPT | Performed by: ORTHOPAEDIC SURGERY

## 2024-10-24 PROCEDURE — 73130 X-RAY EXAM OF HAND: CPT | Mod: RT

## 2024-10-24 PROCEDURE — 1160F RVW MEDS BY RX/DR IN RCRD: CPT | Performed by: ORTHOPAEDIC SURGERY

## 2024-10-24 PROCEDURE — 1159F MED LIST DOCD IN RCRD: CPT | Performed by: ORTHOPAEDIC SURGERY

## 2024-10-24 ASSESSMENT — PAIN - FUNCTIONAL ASSESSMENT: PAIN_FUNCTIONAL_ASSESSMENT: 0-10

## 2024-10-24 ASSESSMENT — ENCOUNTER SYMPTOMS
SHORTNESS OF BREATH: 0
JOINT SWELLING: 1
FEVER: 0
ARTHRALGIAS: 1
CHILLS: 0
SORE THROAT: 0
WHEEZING: 0
FATIGUE: 0
WOUND: 0
RHINORRHEA: 0

## 2024-10-24 ASSESSMENT — PAIN SCALES - GENERAL: PAINLEVEL_OUTOF10: 6

## 2024-10-24 NOTE — PROGRESS NOTES
Reason for Appointment  Chief Complaint   Patient presents with    Right Index Finger - Trigger Finger     History of Present Illness  New patient is a 69 y.o. male here today for evaluation of his right hand.  He has a long history of multiple surgeries on the right hand.  Most recent surgery on the right hand in 2020 included a revision right carpal tunnel release with right thumb, index, long trigger releases and tenosynovectomy and index MP joint arthrotomy.  He has seen improvement in the hand in terms of numbness and tingling.  His biggest issue is some stiffness with making a hard fist and lack of full extension especially in the index finger.  He has not had any further triggering of the digits.  X-rays taken today of the right hand are reviewed and show an ulnar neutral wrist with mild DRUJ arthritis but a maintained radiocarpal joint.  He has mild to moderate CMC arthritis and most pronounced arthritic change in the right index and long MP joints with mild scattered distal digit DJD.    History reviewed. No pertinent past medical history.    Past Surgical History:   Procedure Laterality Date    CT GUIDED PERCUTANEOUS BIOPSY BONE  12/30/2022    CT GUIDED PERCUTANEOUS BIOPSY BONE 12/30/2022       Medication Documentation Review Audit       Reviewed by Lauren Lopez MA (Medical Assistant) on 10/24/24 at 1340      Medication Order Taking? Sig Documenting Provider Last Dose Status   atorvastatin (Lipitor) 20 mg tablet [id number] Yes once every 24 hours. Historical Provider, MD 10/3/2024 Active   diclofenac sodium (Voltaren) 1 % gel gel [id number] Yes APPLY 2 GRAMS TOPICALLY TO THE AFFECTED AREA FOUR TIMES DAILY Historical Provider, MD More than a month Active   gabapentin (Neurontin) 300 mg capsule [id number] Yes Take 1 capsule (300 mg) by mouth 3 times a day. Cesar Rachel MD 10/4/2024 Active   lisinopril 20 mg tablet [id number] Yes Take 1 tablet (20 mg) by mouth once daily. Historical Provider, MD 10/4/2024  Active   metoprolol succinate XL (Toprol-XL) 25 mg 24 hr tablet [id number] Yes Take 1 tablet (25 mg) by mouth once daily. Do not crush or chew. Historical Provider, MD 10/4/2024 Active   mirabegron (Myrbetriq) 25 mg tablet extended release 24 hr 24 hr tablet [id number] Yes Take 1 tablet (25 mg) by mouth once daily. Historical Provider, MD 10/4/2024 Active   prednisoLONE sodium phosphate (Inflamase Forte) 1 % ophthalmic solution [id number] Yes 1 drop 4 times a day. Historical Provider, MD 10/3/2024 Active   tiZANidine (Zanaflex) 4 mg tablet [id number] Yes Take 1 tablet (4 mg) by mouth 3 times a day as needed for muscle spasms. Cesar Rachel MD 10/3/2024 Active   valACYclovir (Valtrex) 1 gram tablet [id number] Yes Take 1 tablet (1,000 mg) by mouth 2 times a day. Historical Provider, MD 10/3/2024 Active                    Allergies   Allergen Reactions    Penicillin V Hives    Penicillin Hives       Review of Systems   Constitutional:  Negative for chills, fatigue and fever.   HENT:  Negative for mouth sores, rhinorrhea and sore throat.    Respiratory:  Negative for shortness of breath and wheezing.    Cardiovascular:  Negative for chest pain and leg swelling.   Musculoskeletal:  Positive for arthralgias and joint swelling.   Skin:  Negative for rash and wound.     Exam   On exam patient is alert, awake, and in no acute distress.  Head is normocephalic, no JVD, no auditory wheezes.  He ha some mild stiffnes with shoulder motion but fairly good cuff strength bilaterally.  Good biceps and triceps strength.  Good elbow and wrist motion.  No severe atrophy is seen in the hand, maybe some mild thenar atrophy but a well-healed previous revision carpal tunnel scar and multiple trigger finger scars and joint arthrotomy scars.  No joint effusions are seen.  He is lacking some hyperextension more in the index but some mild loss of extension of the long finger.  No triggering of the digits today.  He has lost some composite flexion  with heavy gripping compared to the other hand.  Skin is warm and dry, without ulcerations, no other swelling or lymphadenopathy.  Good pulses and sensation in the upper extremity.    Assessment   Encounter Diagnosis   Name Primary?    Right hand pain Yes   Right hand osteoarthritis    Plan   His biggest issue is some stiffness especially in the right index finger MP joint following joint arthrotomy and he does have underlying arthritic change in the joints.  No further surgical intervention is warranted at this point. We would also avoid any injections as he has good overall  retained motion and function.  We would be happy to see him back for any further issues    I, Tammy Mares PA-C, am acting as a scribe and attest that this documentation has been prepared under the direction and in the presence of Zeferino Chicas MD. By signing below, I, Zeferino Chicas MD, personally performed the services described in this documentation. All medical record entries made by the scribe were at my direction and in my presence. I have reviewed the chart and agree that the record reflects my personal performance and is accurate and complete.

## 2024-11-20 ENCOUNTER — ANCILLARY PROCEDURE (OUTPATIENT)
Dept: RADIOLOGY | Facility: EXTERNAL LOCATION | Age: 69
End: 2024-11-20
Payer: COMMERCIAL

## 2024-11-20 DIAGNOSIS — M46.1 SACROILIITIS, NOT ELSEWHERE CLASSIFIED (CMS-HCC): ICD-10-CM

## 2024-11-20 PROCEDURE — 27096 INJECT SACROILIAC JOINT: CPT | Performed by: ANESTHESIOLOGY

## 2024-11-20 NOTE — PROGRESS NOTES
Preprocedure diagnosis: Bilateral sacroiliitis  Postprocedure diagnosis bilateral sacroiliitis    Procedure performed: Bilateral sacroiliac joint injection under fluoroscopic guidance    Physician: Cesar Rachel MD    Anesthesia: Local    Complications: none    Blood loss:  none    Clinical note: This is a very pleasant 69-year-old male who suffers with bilateral low back pain here meeting all medical criteria for above-mentioned procedure.    Procedure:      The patient was identified in the preoperative area.  The procedure was discussed in detail including its risks, benefits, and alternatives.  Signed consent was obtained and the patient agreed to proceed.       The patient was brought to the [facility] procedure room and positioned herself in the prone position onto the procedure room table.  A pillow was placed below the patient's abdomen to decrease lumbar lordosis and a safety strap was placed across the legs.  Next the left and right sacroiliac joint/s were identified with the aid of fluoroscopy in the anterior-posterior view.  The patient was prepped and draped in the usual sterile fashion with Chlorprep. The skin and subcutaneous tissues were infiltrated with 3 ml of 2% Lidocaine using a 25 gauge 1 1/2 inch needle on each side. A 22 gauge spinal needle was advanced with the aid of fluoroscopy in the anterior-posterior view into the inferior posterior pole of the left and right sacroiliac joint/s. Needle placement was confirmed with the aid of fluoroscopy in the anterior-posterior view. After negative aspiration, 0.5ml of omnipaque contrast was injected under live fluoroscopy into each needle which showed appropriate spread. The left and right SI joint/s was delineated with the aid of fluoroscopy in the anterior-posterior view.     After negative aspiration, 4ml of 0.5% bupivacaine along with 40mg of triamcinolone was injected into each needle     All needles were removed intact. Hemostasis was  maintained, and there were no paresthesias or complications noted. The area was cleaned and a bandage was applied as appropriate.  The patient was then transferred to the recovery area. The procedure was completed without complications and was tolerated well. The patient was monitored after the procedure. The patient (or responsible party) was given post-procedure and discharge instructions to follow at home. The patient was discharged in stable condition. A follow up appointment was made.          Cesar Rachel MD

## 2024-12-05 ENCOUNTER — APPOINTMENT (OUTPATIENT)
Dept: ORTHOPEDIC SURGERY | Facility: HOSPITAL | Age: 69
End: 2024-12-05
Payer: COMMERCIAL

## 2024-12-12 ENCOUNTER — OFFICE VISIT (OUTPATIENT)
Dept: ORTHOPEDIC SURGERY | Facility: HOSPITAL | Age: 69
End: 2024-12-12
Payer: MEDICARE

## 2024-12-12 DIAGNOSIS — M19.041 PRIMARY OSTEOARTHRITIS OF RIGHT HAND: Primary | ICD-10-CM

## 2024-12-12 PROCEDURE — 2500000004 HC RX 250 GENERAL PHARMACY W/ HCPCS (ALT 636 FOR OP/ED): Performed by: ORTHOPAEDIC SURGERY

## 2024-12-12 PROCEDURE — 20604 DRAIN/INJ JOINT/BURSA W/US: CPT | Mod: RT | Performed by: ORTHOPAEDIC SURGERY

## 2024-12-12 PROCEDURE — 1159F MED LIST DOCD IN RCRD: CPT | Performed by: ORTHOPAEDIC SURGERY

## 2024-12-12 PROCEDURE — 1036F TOBACCO NON-USER: CPT | Performed by: ORTHOPAEDIC SURGERY

## 2024-12-12 PROCEDURE — 1160F RVW MEDS BY RX/DR IN RCRD: CPT | Performed by: ORTHOPAEDIC SURGERY

## 2024-12-12 PROCEDURE — 99213 OFFICE O/P EST LOW 20 MIN: CPT | Performed by: ORTHOPAEDIC SURGERY

## 2024-12-12 PROCEDURE — 1125F AMNT PAIN NOTED PAIN PRSNT: CPT | Performed by: ORTHOPAEDIC SURGERY

## 2024-12-12 ASSESSMENT — PAIN - FUNCTIONAL ASSESSMENT: PAIN_FUNCTIONAL_ASSESSMENT: 0-10

## 2024-12-12 ASSESSMENT — PAIN SCALES - GENERAL: PAINLEVEL_OUTOF10: 7

## 2024-12-13 RX ORDER — LIDOCAINE HYDROCHLORIDE 10 MG/ML
1 INJECTION, SOLUTION INFILTRATION; PERINEURAL
Status: COMPLETED | OUTPATIENT
Start: 2024-12-12 | End: 2024-12-12

## 2024-12-13 RX ORDER — METHYLPREDNISOLONE ACETATE 40 MG/ML
20 INJECTION, SUSPENSION INTRA-ARTICULAR; INTRALESIONAL; INTRAMUSCULAR; SOFT TISSUE
Status: COMPLETED | OUTPATIENT
Start: 2024-12-12 | End: 2024-12-12

## 2024-12-13 ASSESSMENT — ENCOUNTER SYMPTOMS
CHILLS: 0
WHEEZING: 0
SHORTNESS OF BREATH: 0
JOINT SWELLING: 1
FEVER: 0
ARTHRALGIAS: 1
WOUND: 0
SINUS PRESSURE: 0
FATIGUE: 0

## 2024-12-13 NOTE — PROGRESS NOTES
Reason for Appointment  Chief Complaint   Patient presents with    Right Index Finger - Trigger Finger     History of Present Illness  Patient is a 69 y.o. male here today for follow-up evaluation of continued right index finger pain and stiffness.  He again has a history of multiple surgeries on the right hand including revision carpal tunnel release with right thumb, index, and long trigger releases and tenosynovitis mise and a right index finger MP joint arthrotomy.  Previous x-rays of the right hand show an ulnar neutral wrist with mild DRUJ arthritis and mild to moderate CMC arthritis with an significant arthritis mostly at the right index MP joint but some arthritic change at the right long MP joint as well.  His biggest issue is lack of motion of the index finger MP joint and he is unable to make a hard fist and do any heavy gripping.  The fingers are not locking or catching on him.  No other changes in his past medical history, allergies, or medications.    History reviewed. No pertinent past medical history.    Past Surgical History:   Procedure Laterality Date    CT GUIDED PERCUTANEOUS BIOPSY BONE  12/30/2022    CT GUIDED PERCUTANEOUS BIOPSY BONE 12/30/2022       Medication Documentation Review Audit       Reviewed by Lauren Lopez MA (Medical Assistant) on 12/12/24 at 1425      Medication Order Taking? Sig Documenting Provider Last Dose Status   atorvastatin (Lipitor) 20 mg tablet [id number] Yes once every 24 hours. Historical Provider, MD 10/3/2024 Active   diclofenac sodium (Voltaren) 1 % gel gel [id number] Yes APPLY 2 GRAMS TOPICALLY TO THE AFFECTED AREA FOUR TIMES DAILY Historical Provider, MD More than a month Active   gabapentin (Neurontin) 300 mg capsule [id number] Yes Take 1 capsule (300 mg) by mouth 3 times a day. Cesar Rachel MD 10/4/2024 Active   lisinopril 20 mg tablet [id number] Yes Take 1 tablet (20 mg) by mouth once daily. Historical Provider, MD 10/4/2024 Active   metoprolol succinate  XL (Toprol-XL) 25 mg 24 hr tablet [id number] Yes Take 1 tablet (25 mg) by mouth once daily. Do not crush or chew. Historical Provider, MD 10/4/2024 Active   mirabegron (Myrbetriq) 25 mg tablet extended release 24 hr 24 hr tablet [id number] Yes Take 1 tablet (25 mg) by mouth once daily. Historical Provider, MD 10/4/2024 Active   prednisoLONE sodium phosphate (Inflamase Forte) 1 % ophthalmic solution [id number] Yes 1 drop 4 times a day. Historical Provider, MD 10/3/2024 Active   tiZANidine (Zanaflex) 4 mg tablet [id number] Yes Take 1 tablet (4 mg) by mouth 3 times a day as needed for muscle spasms. Cesar Rachel MD 10/3/2024 Active   valACYclovir (Valtrex) 1 gram tablet [id number] Yes Take 1 tablet (1,000 mg) by mouth 2 times a day. Historical Provider, MD 10/3/2024 Active                    Allergies   Allergen Reactions    Penicillin V Hives    Penicillin Hives       Review of Systems   Constitutional:  Negative for chills, fatigue and fever.   HENT:  Negative for postnasal drip, sinus pressure and tinnitus.    Respiratory:  Negative for shortness of breath and wheezing.    Cardiovascular:  Negative for chest pain and leg swelling.   Musculoskeletal:  Positive for arthralgias and joint swelling.   Skin:  Negative for pallor and wound.     Exam   Exam of the right hand shows scattered osteoarthritis and he does get some clicking at the DRUJ but no significant pain swelling or instability in this region no ECU subluxation.  He does lack motion at the right index MP joint of approximately 20 degrees of full flexion but good extension tender over this joint previous old arthrotomy site is seen.  There is no triggering over the A1 pulley over the right index good sensation throughout.  There is distal joint osteoarthritis scattered throughout the hands but no other triggering good baseline pulses sensation.  He has had the previous trigger surgeries and the carpal tunnel releases.  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  Isolated injections of cortisone and regions that give him the most pain is the best plan for the future along with keeping range of motion with splinting for heavier lifting arthritis gloves and even Voltaren gel which was discussed.  We decided together today to go with a MP joint injection into the right index finger.  This was done under ultrasound guidance using sterile technique.  No complications patient understands postoperative care and hopefully will get some long-term benefit.  I am happy to see him back at any point to discuss further injections or treatment but no surgical intervention is warranted at this juncture    Patient ID: Johnny Epstein is a 69 y.o. male.    S Inj/Asp: R index MCP on 12/12/2024 3:00 PM  Indications: pain  Details: 25 G needle, ultrasound-guided  Medications: 1 mL lidocaine 10 mg/mL (1 %); 20 mg methylPREDNISolone acetate 40 mg/mL  Outcome: tolerated well, no immediate complications  Procedure, treatment alternatives, risks and benefits explained, specific risks discussed. Immediately prior to procedure a time out was called to verify the correct patient, procedure, equipment, support staff and site/side marked as required. Patient was prepped and draped in the usual sterile fashion.

## 2025-02-19 DIAGNOSIS — M43.06 LUMBAR SPONDYLOLYSIS: ICD-10-CM

## 2025-02-19 RX ORDER — TIZANIDINE 4 MG/1
4 TABLET ORAL 3 TIMES DAILY PRN
Qty: 270 TABLET | Refills: 0 | Status: SHIPPED | OUTPATIENT
Start: 2025-02-19

## 2025-03-10 ENCOUNTER — TELEPHONE (OUTPATIENT)
Dept: NEUROSURGERY | Facility: HOSPITAL | Age: 70
End: 2025-03-10
Payer: MEDICARE

## 2025-03-10 NOTE — TELEPHONE ENCOUNTER
I spoke with the patient about his appointment with Dr. Dailey. He needs to see a spine provider. He is in agreement to have his appointment cancelled and would like to see Dr. Dawson Taylor. Message sent to [employer] to schedule.    Cande Aguilar R.N.

## 2025-03-12 PROBLEM — N52.9 IMPOTENCE OF ORGANIC ORIGIN: Status: ACTIVE | Noted: 2024-02-09

## 2025-03-12 PROBLEM — N40.1 BENIGN PROSTATIC HYPERPLASIA WITH LOWER URINARY TRACT SYMPTOMS: Status: ACTIVE | Noted: 2023-08-28

## 2025-03-12 PROBLEM — B02.30 ZOSTER OPHTHALMICUS: Status: ACTIVE | Noted: 2023-02-09

## 2025-03-12 PROBLEM — R73.01 IMPAIRED FASTING GLUCOSE: Status: ACTIVE | Noted: 2023-09-29

## 2025-03-12 PROBLEM — E55.9 VITAMIN D DEFICIENCY, UNSPECIFIED: Status: ACTIVE | Noted: 2023-09-29

## 2025-03-12 PROBLEM — D64.9 ANEMIA: Status: ACTIVE | Noted: 2023-09-29

## 2025-03-12 PROBLEM — I10 HYPERTENSION: Status: ACTIVE | Noted: 2025-03-12

## 2025-03-12 PROBLEM — M47.814 SPONDYLOSIS WITHOUT MYELOPATHY OR RADICULOPATHY, THORACIC REGION: Status: ACTIVE | Noted: 2025-03-12

## 2025-03-12 PROBLEM — M96.1 FAILED BACK SYNDROME OF LUMBAR SPINE: Status: ACTIVE | Noted: 2025-03-12

## 2025-03-12 PROBLEM — F41.9 ANXIETY: Status: ACTIVE | Noted: 2020-11-17

## 2025-03-12 PROBLEM — I10 ESSENTIAL (PRIMARY) HYPERTENSION: Status: ACTIVE | Noted: 2020-11-27

## 2025-03-12 PROBLEM — G56.00 CARPAL TUNNEL SYNDROME: Status: ACTIVE | Noted: 2020-08-21

## 2025-03-12 PROBLEM — E78.5 HYPERLIPIDEMIA, UNSPECIFIED: Status: ACTIVE | Noted: 2019-04-15

## 2025-03-12 PROBLEM — G89.29 CHRONIC PAIN: Status: ACTIVE | Noted: 2025-03-12

## 2025-03-12 PROBLEM — E87.6 HYPOKALEMIA: Status: ACTIVE | Noted: 2023-12-11

## 2025-03-12 PROBLEM — I47.10 PAROXYSMAL SVT (SUPRAVENTRICULAR TACHYCARDIA) (CMS-HCC): Status: ACTIVE | Noted: 2024-02-05

## 2025-03-12 PROBLEM — M99.01 SOMATIC DYSFUNCTION OF CERVICAL REGION: Status: ACTIVE | Noted: 2019-04-15

## 2025-03-12 PROBLEM — G54.1 LUMBOSACRAL PLEXUS LESION: Status: ACTIVE | Noted: 2019-06-01

## 2025-03-12 PROBLEM — H52.10 MYOPIA: Status: ACTIVE | Noted: 2025-03-12

## 2025-03-12 PROBLEM — M19.049 ARTHRITIS OF HAND: Status: ACTIVE | Noted: 2020-07-28

## 2025-03-12 PROBLEM — M46.47 DISCITIS OF LUMBOSACRAL REGION: Status: ACTIVE | Noted: 2023-01-24

## 2025-03-12 PROBLEM — M46.1 BILATERAL SACROILIITIS (CMS-HCC): Status: ACTIVE | Noted: 2025-03-12

## 2025-03-12 PROBLEM — N13.8 OTHER OBSTRUCTIVE AND REFLUX UROPATHY: Status: ACTIVE | Noted: 2023-08-28

## 2025-03-12 PROBLEM — M47.816 SPONDYLOSIS WITHOUT MYELOPATHY OR RADICULOPATHY, LUMBAR REGION: Status: ACTIVE | Noted: 2025-03-12

## 2025-03-12 PROBLEM — J93.83 SPONTANEOUS PNEUMOTHORAX: Status: ACTIVE | Noted: 2019-06-26

## 2025-03-12 PROBLEM — M48.10 DISH (DIFFUSE IDIOPATHIC SKELETAL HYPEROSTOSIS): Status: ACTIVE | Noted: 2025-03-12

## 2025-03-12 PROBLEM — M47.9 DEGENERATIVE ARTHRITIS OF SPINE: Status: ACTIVE | Noted: 2023-02-09

## 2025-03-12 PROBLEM — T88.4XXA DIFFICULT AIRWAY: Status: ACTIVE | Noted: 2021-08-23

## 2025-03-12 PROBLEM — M47.817 SPONDYLOSIS WITHOUT MYELOPATHY OR RADICULOPATHY, LUMBOSACRAL REGION: Status: ACTIVE | Noted: 2025-03-12

## 2025-03-12 PROBLEM — M48.061 SPINAL STENOSIS OF LUMBAR REGION: Status: ACTIVE | Noted: 2022-03-24

## 2025-03-12 RX ORDER — SYRING-NEEDL,DISP,INSUL,0.3 ML 29 G X1/2"
SYRINGE, EMPTY DISPOSABLE MISCELLANEOUS
COMMUNITY

## 2025-03-12 RX ORDER — ERTAPENEM 1 G/1
INJECTION, POWDER, LYOPHILIZED, FOR SOLUTION INTRAMUSCULAR; INTRAVENOUS
COMMUNITY

## 2025-03-12 RX ORDER — ELECTROLYTES/DEXTROSE
SOLUTION, ORAL ORAL
COMMUNITY

## 2025-03-12 RX ORDER — GINGER ROOT 550 MG
1 CAPSULE ORAL
COMMUNITY

## 2025-03-12 RX ORDER — IBUPROFEN 600 MG/1
600 TABLET ORAL EVERY 8 HOURS
COMMUNITY

## 2025-03-12 RX ORDER — NIACIN (INOSITOL NIACINATE) 400(500MG)
CAPSULE ORAL
COMMUNITY

## 2025-03-12 RX ORDER — VITAMIN B COMPLEX
1 CAPSULE ORAL
COMMUNITY

## 2025-03-12 RX ORDER — UBIDECARENONE 30 MG
30 CAPSULE ORAL
COMMUNITY

## 2025-03-12 RX ORDER — NAPROXEN SODIUM 220 MG/1
0.5 TABLET, FILM COATED ORAL
COMMUNITY

## 2025-03-12 RX ORDER — SILDENAFIL 100 MG/1
TABLET, FILM COATED ORAL
COMMUNITY

## 2025-03-12 RX ORDER — CHOLECALCIFEROL (VITAMIN D3) 25 MCG
TABLET ORAL
COMMUNITY

## 2025-03-12 RX ORDER — CELECOXIB 200 MG/1
1 CAPSULE ORAL DAILY
COMMUNITY

## 2025-03-12 RX ORDER — PREDNISOLONE ACETATE 10 MG/ML
SUSPENSION/ DROPS OPHTHALMIC
COMMUNITY
Start: 2024-10-04

## 2025-03-12 RX ORDER — CLINDAMYCIN HYDROCHLORIDE 150 MG/1
CAPSULE ORAL
COMMUNITY
Start: 2024-12-02

## 2025-03-12 RX ORDER — CALCIUM CARBONATE/VITAMIN D3 600MG-5MCG
1 TABLET ORAL
COMMUNITY

## 2025-03-12 RX ORDER — LISINOPRIL AND HYDROCHLOROTHIAZIDE 20; 25 MG/1; MG/1
1 TABLET ORAL
COMMUNITY
Start: 2024-05-06

## 2025-03-12 RX ORDER — MULTIVITAMIN
1 TABLET ORAL DAILY
COMMUNITY

## 2025-03-12 RX ORDER — TAMSULOSIN HYDROCHLORIDE 0.4 MG/1
CAPSULE ORAL EVERY 24 HOURS
COMMUNITY

## 2025-03-12 RX ORDER — CITALOPRAM 20 MG/1
TABLET, FILM COATED ORAL EVERY 24 HOURS
COMMUNITY

## 2025-03-12 RX ORDER — ASCORBIC ACID 1000 MG
TABLET ORAL
COMMUNITY

## 2025-03-31 ENCOUNTER — APPOINTMENT (OUTPATIENT)
Dept: NEUROSURGERY | Facility: CLINIC | Age: 70
End: 2025-03-31
Payer: MEDICARE

## 2025-03-31 VITALS
BODY MASS INDEX: 24.95 KG/M2 | HEART RATE: 88 BPM | WEIGHT: 164.6 LBS | TEMPERATURE: 97.5 F | SYSTOLIC BLOOD PRESSURE: 146 MMHG | DIASTOLIC BLOOD PRESSURE: 88 MMHG | HEIGHT: 68 IN

## 2025-03-31 DIAGNOSIS — M54.12 CERVICAL RADICULOPATHY: Primary | ICD-10-CM

## 2025-03-31 DIAGNOSIS — Z98.1 S/P CERVICAL SPINAL FUSION: ICD-10-CM

## 2025-03-31 DIAGNOSIS — Z98.890 S/P LAMINECTOMY: ICD-10-CM

## 2025-03-31 DIAGNOSIS — M54.16 LUMBAR RADICULOPATHY: ICD-10-CM

## 2025-03-31 DIAGNOSIS — G95.9 CERVICAL MYELOPATHY: ICD-10-CM

## 2025-03-31 PROCEDURE — 1125F AMNT PAIN NOTED PAIN PRSNT: CPT | Performed by: NURSE PRACTITIONER

## 2025-03-31 PROCEDURE — 3008F BODY MASS INDEX DOCD: CPT | Performed by: NURSE PRACTITIONER

## 2025-03-31 PROCEDURE — 3079F DIAST BP 80-89 MM HG: CPT | Performed by: NURSE PRACTITIONER

## 2025-03-31 PROCEDURE — 3077F SYST BP >= 140 MM HG: CPT | Performed by: NURSE PRACTITIONER

## 2025-03-31 PROCEDURE — 1160F RVW MEDS BY RX/DR IN RCRD: CPT | Performed by: NURSE PRACTITIONER

## 2025-03-31 PROCEDURE — 1159F MED LIST DOCD IN RCRD: CPT | Performed by: NURSE PRACTITIONER

## 2025-03-31 PROCEDURE — 1036F TOBACCO NON-USER: CPT | Performed by: NURSE PRACTITIONER

## 2025-03-31 PROCEDURE — 99205 OFFICE O/P NEW HI 60 MIN: CPT | Performed by: NURSE PRACTITIONER

## 2025-03-31 ASSESSMENT — PATIENT HEALTH QUESTIONNAIRE - PHQ9
SUM OF ALL RESPONSES TO PHQ9 QUESTIONS 1 AND 2: 1
1. LITTLE INTEREST OR PLEASURE IN DOING THINGS: NOT AT ALL
2. FEELING DOWN, DEPRESSED OR HOPELESS: SEVERAL DAYS

## 2025-03-31 ASSESSMENT — ENCOUNTER SYMPTOMS: OCCASIONAL FEELINGS OF UNSTEADINESS: 0

## 2025-03-31 ASSESSMENT — PAIN SCALES - GENERAL: PAINLEVEL_OUTOF10: 7

## 2025-03-31 NOTE — PROGRESS NOTES
[facility] [place]  Department of Neurological Surgery  New Patient Visit    History of Present Illness:     Johnny Epstein is a 69 y.o. year old male who presents to the spine clinic with chronic neck and low back pain.  He has a history of C5-6 fusion without hardware in 1994 at [facility] in [facility].  Was doing well until about January 2025 when he started experiencing neck pain radiating into his right shoulder and down his right upper extremity.  He is also started to have notable weakness in that extremity.  He is taking over-the-counter medications and gabapentin 3 times daily which is prescribed to him by Dr. Cesar Rachel and pain medicine.  He has not received any injections in his neck but has been doing physical therapy via a home exercise program 2-3 times a week 10 to 15 minutes each session which includes: chin tucks, head tilts, and shoulder blade squeezes.  His lumbar region pain and radiculopathy has been ongoing for many years started back in the 1980s when he underwent an IDET (Intradiscal Electrothermal Therapy), followed by an L4-5 hemilaminectomy in approximately 2015 at [facility] and 2 surgeries at [facility] between the years of 1052-9120 that sound like foraminotomies versus laminectomies with no hardware placement.  He has never fully received relief from his lower extremity radiculopathy.  He describes the pain along what sounds like the S1 dermatome and does not go past his ankle.  He has also received multiple injections with pain medicine as well as bilateral SI joint injections last in November 2024 only provided him approximately 8 weeks of relief.  He has also been doing a home exercise program for his low back 2-3 times a week 10 to 15 minutes each session which includes: pelvic tilts, bridges, knee-to-chest stretches, seated low back rotational stretch, cat-cow, and bird-dog stretches.  He is concerned about his lumbar region however he is more concerned  about his cervical region.    Prior Spine Surgeries: C5-6 fusion without hardware in 1994 at [facility] in [facility]. 1980s when he underwent an IDET (Intradiscal Electrothermal Therapy), followed by an L4-5 hemilaminectomy in approximately 2015 at [facility] and 2 surgeries at [facility] between the years of 5505-8005 that sound like foraminotomies versus laminectomies with no hardware placement.    Physical Therapy: home exercise programs: see above    Diabetic: no   Lab Results   Component Value Date    HGBA1C 4.7 02/18/2025        Osteoporosis: no      Patient's BMI is Body mass index is 25.03 kg/m².    Smoking History: never a smoker    14/14 systems reviewed and negative other than what is listed in the history of present illness  Patient Active Problem List   Diagnosis    Anemia    Benign prostatic hyperplasia with lower urinary tract symptoms    Hyperlipidemia, unspecified    Hypokalemia    Impaired fasting glucose    Other obstructive and reflux uropathy    Vitamin D deficiency, unspecified    Arthritis of hand    Anxiety    Bilateral sacroiliitis    Carpal tunnel syndrome    Cervical radiculitis    Chronic pain    Spondylosis of cervical spine without myelopathy    Degeneration of intervertebral disc of cervical region    Difficult airway    Discitis of lumbosacral region    DISH (diffuse idiopathic skeletal hyperostosis)    Failed back syndrome of lumbar spine    Generalized OA    Herpes zoster keratoconjunctivitis    Essential (primary) hypertension    Hypertension    Impotence of organic origin    Lumbar foraminal stenosis    Spinal stenosis of lumbar region    Lumbosacral plexus lesion    MVP (mitral valve prolapse)    Myopia    Palpitations    Paroxysmal SVT (supraventricular tachycardia) (CMS-HCC)    Somatic dysfunction of cervical region    Degenerative arthritis of spine    Spondylosis without myelopathy or radiculopathy, lumbar region    Spondylosis without myelopathy or radiculopathy, lumbosacral  region    Spondylosis without myelopathy or radiculopathy, thoracic region    Spontaneous pneumothorax    Zoster ophthalmicus     History reviewed. No pertinent past medical history.  Past Surgical History:   Procedure Laterality Date    CT GUIDED PERCUTANEOUS BIOPSY BONE  12/30/2022    CT GUIDED PERCUTANEOUS BIOPSY BONE 12/30/2022     Social History     Tobacco Use    Smoking status: Never     Passive exposure: Never    Smokeless tobacco: Never   Substance Use Topics    Alcohol use: Never     family history is not on file.    Current Outpatient Medications:     atorvastatin (Lipitor) 20 mg tablet, once every 24 hours., Disp: , Rfl:     b complex vitamins (Vitamins B Complex) capsule, Take 1 capsule by mouth once daily., Disp: , Rfl:     biotin 5 mg capsule, Take by mouth once daily., Disp: , Rfl:     calcium carbonate-vitamin D3 600 mg-5 mcg (200 unit) tablet, Take 1 tablet by mouth once daily., Disp: , Rfl:     cholecalciferol (Vitamin D-3) 25 mcg (1000 units) tablet, Take by mouth., Disp: , Rfl:     clindamycin (Cleocin) 150 mg capsule, , Disp: , Rfl:     diclofenac sodium (Voltaren) 1 % gel gel, APPLY 2 GRAMS TOPICALLY TO THE AFFECTED AREA FOUR TIMES DAILY, Disp: , Rfl:     fluticasone propionate (FLONASE NASL), Flonase, Disp: , Rfl:     gabapentin (Neurontin) 300 mg capsule, Take 1 capsule (300 mg) by mouth 3 times a day., Disp: 270 capsule, Rfl: 3    ibuprofen 600 mg tablet, Take 1 tablet (600 mg) by mouth every 8 hours., Disp: , Rfl:     lisinopriL-hydrochlorothiazide 20-25 mg tablet, Take 1 tablet by mouth once daily in the morning. Take before meals., Disp: , Rfl:     metoprolol succinate XL (Toprol-XL) 25 mg 24 hr tablet, Take 1 tablet (25 mg) by mouth once daily. Do not crush or chew., Disp: , Rfl:     mirabegron (Myrbetriq) 25 mg tablet extended release 24 hr 24 hr tablet, Take 1 tablet (25 mg) by mouth once daily., Disp: , Rfl:     multivit with min-folic acid 0.4 mg tablet, Take 1 tablet by mouth once  daily., Disp: , Rfl:     OMEGA-3 FATTY ACIDS ORAL, Take 1 capsule by mouth once daily., Disp: , Rfl:     prednisoLONE acetate (Pred-Forte) 1 % ophthalmic suspension, , Disp: , Rfl:     sildenafil (Viagra) 100 mg tablet, TAKE 1 TABLET BY MOUTH ONCE DAILY AS NEEDED. TAKE 30-60 MINUTES BEFORE SEXUAL ACTIVITY, Disp: , Rfl:     tiZANidine (Zanaflex) 4 mg tablet, TAKE 1 TABLET BY MOUTH 3 TIMES  DAILY AS NEEDED FOR MUSCLE  SPASM(S), Disp: 270 tablet, Rfl: 0    valACYclovir (Valtrex) 1 gram tablet, Take 1 tablet (1,000 mg) by mouth 2 times a day., Disp: , Rfl:     aspirin 81 mg chewable tablet, Chew 0.5 tablets (40.5 mg) once daily., Disp: , Rfl:     CALCIUM POLYCARBOPHIL ORAL, Take 2 capsules by mouth once daily. (Patient not taking: Reported on 3/31/2025), Disp: , Rfl:     celecoxib (CeleBREX) 200 mg capsule, Take 1 capsule (200 mg) by mouth once daily. (Patient not taking: Reported on 3/31/2025), Disp: , Rfl:     citalopram (CeleXA) 20 mg tablet, Take by mouth once every 24 hours. (Patient not taking: Reported on 3/31/2025), Disp: , Rfl:     co-enzyme Q-10 30 mg capsule, Take 1 capsule (30 mg) by mouth once daily., Disp: , Rfl:     coenzyme Q10-vitamin E 100-5 mg-unit capsule, once daily., Disp: , Rfl:     ertapenem (INVanz) 1 gram injection, INVanz 1 GM as directed Injection for spine infection Active (Patient not taking: Reported on 3/31/2025), Disp: , Rfl:     ginger root (ginger, Zingiber officinalis,) 550 mg capsule, Take 1 tablet by mouth once daily., Disp: , Rfl:     ginkgo biloba 40 mg tablet, Take by mouth., Disp: , Rfl:     lisinopril 20 mg tablet, Take 1 tablet (20 mg) by mouth once daily. (Patient not taking: Reported on 3/31/2025), Disp: , Rfl:     magnesium citrate solution, Take by mouth. (Patient not taking: Reported on 3/31/2025), Disp: , Rfl:     mv-mn/iron/folic acid/herb 190 (VITAMIN D3 COMPLETE ORAL), Take by mouth once daily. (Patient not taking: Reported on 3/31/2025), Disp: , Rfl:     prednisoLONE  sodium phosphate (Inflamase Forte) 1 % ophthalmic solution, 1 drop 4 times a day. (Patient not taking: Reported on 3/31/2025), Disp: , Rfl:     tamsulosin (Flomax) 0.4 mg 24 hr capsule, Take by mouth once every 24 hours., Disp: , Rfl:   Allergies   Allergen Reactions    Penicillin V Hives    Penicillin Hives       Physical Examination:     General: Well developed, awake/alert/oriented x3, no distress, alert and cooperative  Skin: Warm and dry, no lesions, no rashes  ENMT: Mucous membranes moist, no apparent injury, no lesions seen  Head/Neck: Neck Supple, no apparent injury  Respiratory/Thorax: Normal breath sounds with good chest expansion, thorax symmetric  Cardiovascular: No pitting edema, no JVD    Cranial nerves   II/III: Visual fields are full. Pupils are reactive bilaterally.  III/IV/VI: Extraocular movements are full with no nystagmus.   V: Facial sensation is intact to light touch.  VII: Face is symmetric.  VIII: hearing is intact bilaterally.  IX/X: Palate elevates symmetrically to phonation.  XI: Sternocleidomastoid is 5/5 to strength testing with decreased range of motion to the right.  XII: Tongue is midline.    [place] Coma Scale  Best Eye Response: 4: Opens eyes spontaneously   Best Verbal Response: 5: Oriented, converses normally   Best Motor Response: 6: Obeys commands   Andre Coma Scale Score: 15    Motor Strength: 5/5 strength in left upper extremity and bilateral lower extremities.  4/5 strength in right upper extremity.    Decreased range of motion in the neck turning to the right    Muscle Bulk: Normal and symmetric in all extremities     Paraspinal muscle spasm/tenderness present at C6-T1 region on the right and L3-S1 on the left    Midline tenderness present at C6-T1 and L3-S1    Sensation to light touch intact    SI joint provacative tests: Distraction positive on the left, thigh thrust negative bilaterally, Fco positive on the left, compression negative bilaterally, Gaenslen's negative  bilaterally    Results:     I personally reviewed and interpreted the imaging results which included MRI of the cervical, lumbar and thoracic spine from December 2023 at [facility] there were provided on CD and loaded into PACS.  The cervical MRI revealed a C5/6 fusion as well as multilevel degenerative disease.  His lumbar MRI demonstrated multilevel degenerative disease with mild central stenosis at multiple levels with significant disc height loss at L5-S1.    Assessment and Plan:     Johnny Epstein is a 69 y.o. year old male who presents to the spine clinic with chronic neck and low back pain.  He has a history of C5-6 fusion without hardware in 1994 at [facility] in [facility].  Was doing well until about January 2025 when he started experiencing neck pain radiating into his right shoulder and down his right upper extremity.  He is also started to have notable weakness in that extremity.  He is taking over-the-counter medications and gabapentin 3 times daily which is prescribed to him by Dr. Cesar Rachel and pain medicine.  He has not received any injections in his neck but has been doing physical therapy via a home exercise program 2-3 times a week 10 to 15 minutes each session which includes: chin tucks, head tilts, and shoulder blade squeezes.  His lumbar region pain and radiculopathy has been ongoing for many years started back in the 1980s when he underwent an IDET (Intradiscal Electrothermal Therapy), followed by an L4-5 hemilaminectomy in approximately 2015 at [facility] and 2 surgeries at [facility] between the years of 1783-3655 that sound like foraminotomies versus laminectomies with no hardware placement.  He has never fully received relief from his lower extremity radiculopathy.  He describes the pain along what sounds like the S1 dermatome and does not go past his ankle.  He has also received multiple injections with pain medicine as well as bilateral SI joint injections last in November 2024  only provided him approximately 8 weeks of relief.  He has also been doing a home exercise program for his low back 2-3 times a week 10 to 15 minutes each session which includes: pelvic tilts, bridges, knee-to-chest stretches, seated low back rotational stretch, cat-cow, and bird-dog stretches.  He is concerned about his lumbar region however he is more concerned about his cervical region.    On physical exam the patient has tenderness at C6-T1 that radiates to the right and L3-S1 that radiates to the left.  He has 5/5 strength in bilateral lower extremities and left upper extremity and 4/5 strength in right upper extremity.  Negative Tamar's and negative clonus.  No focal deficits on neurologic exam.  He did have difficulty with cervical range of motion to the right.  He denied any loss of bowel or bladder control or saddle anesthesia.  He states that he often drops things out of his right hand and occasionally out of his left hand but denies any gait instability. SI joint provacative tests: Distraction positive on the left, thigh thrust negative bilaterally, Fco positive on the left, compression negative bilaterally, Gaenslen's negative bilaterally.    I would like to order an MRI of his cervical spine to evaluate for cord compression, nerve involvement and soft tissue.  I would also like to get a set of dynamic x-rays of both his cervical and lumbar region to evaluate for stability with flexion and extension.  Lastly I would like to get an MRI of his lumbar region to evaluate for nerve and soft tissue involvement in the setting of failed nonsurgical approaches.  I will contact the patient with results of the imaging and review if there are any urgent surgical needs.  If there is a clear-cut surgical indication I will set him up to see one of our surgeons for surgical planning discussion as well as obtain additional imaging such as CT scan of the cervical/lumbar region in order to evaluate the bony anatomy  better for surgical planning.  If any significant scoliosis is noted on MRI would consider EOS scoliosis x-rays for preoperative planning.  I reviewed red flag symptoms with the patient as well as how and when to seek emergency medical care.  He will contact the office with any questions or concerns.    Samantha Meeson, MSN, NP-C  Adult-Gerontology Associate Nurse Practitioner  Department of Neurosurgery- [facility] [place]  Main phone [phone]  Fax [phone]

## 2025-04-10 ENCOUNTER — APPOINTMENT (OUTPATIENT)
Dept: NEUROSURGERY | Facility: CLINIC | Age: 70
End: 2025-04-10
Payer: MEDICARE

## 2025-04-16 ENCOUNTER — HOSPITAL ENCOUNTER (OUTPATIENT)
Dept: RADIOLOGY | Facility: HOSPITAL | Age: 70
Discharge: HOME | End: 2025-04-16
Payer: MEDICARE

## 2025-04-16 DIAGNOSIS — M54.16 LUMBAR RADICULOPATHY: ICD-10-CM

## 2025-04-16 DIAGNOSIS — G95.9 CERVICAL MYELOPATHY: ICD-10-CM

## 2025-04-16 DIAGNOSIS — Z98.1 S/P CERVICAL SPINAL FUSION: ICD-10-CM

## 2025-04-16 DIAGNOSIS — Z98.890 S/P LAMINECTOMY: ICD-10-CM

## 2025-04-16 DIAGNOSIS — M54.12 CERVICAL RADICULOPATHY: ICD-10-CM

## 2025-04-16 PROCEDURE — 72141 MRI NECK SPINE W/O DYE: CPT

## 2025-04-16 PROCEDURE — 72052 X-RAY EXAM NECK SPINE 6/>VWS: CPT

## 2025-04-16 PROCEDURE — 72120 X-RAY BEND ONLY L-S SPINE: CPT

## 2025-04-16 PROCEDURE — 72148 MRI LUMBAR SPINE W/O DYE: CPT

## 2025-04-16 PROCEDURE — 72148 MRI LUMBAR SPINE W/O DYE: CPT | Performed by: RADIOLOGY

## 2025-04-23 ENCOUNTER — TELEPHONE (OUTPATIENT)
Dept: NEUROSURGERY | Facility: CLINIC | Age: 70
End: 2025-04-23
Payer: MEDICARE

## 2025-04-23 DIAGNOSIS — M54.16 LUMBAR RADICULOPATHY: Primary | ICD-10-CM

## 2025-04-23 DIAGNOSIS — Z98.1 S/P CERVICAL SPINAL FUSION: ICD-10-CM

## 2025-04-23 DIAGNOSIS — Z98.890 S/P LAMINECTOMY: ICD-10-CM

## 2025-04-23 DIAGNOSIS — M54.12 CERVICAL RADICULOPATHY: ICD-10-CM

## 2025-04-23 NOTE — TELEPHONE ENCOUNTER
Result Communication    Resulted Orders   MR lumbar spine wo IV contrast    Narrative    Interpreted By:  Dakota Lyles,   STUDY:  MRI of the lumbar spine without IV contrast;  4/16/2025 12:08 pm      INDICATION:  Signs/Symptoms:Lumbar radiculopathy, left SI joint dysfunction, prior  left L4-L5 and L5-S1 unilateral laminectomy with revision of left  L4-L5 and L5-S1 microdiscectomies. And revision of left L4-L5 and  L5-S1 foraminotomies.      ,M54.16 Radiculopathy, lumbar region,Z98.890 Other specified  postprocedural states      COMPARISON:  X-ray of April 16, 2025, MRI of December 5, 2023 and February 11, 2022      ACCESSION NUMBER(S):  [id number]      ORDERING CLINICIAN:  SAMANTHA MEESON      TECHNIQUE:  Multiplanar, multisequence imaging obtained through the lumbar spine  without contrast      FINDINGS:  The L5-S1 level be defined as axial T2 series 1017 image 17.      Alignment: There is subtle grade 1 retrolisthesis of L1 on L2, L2 on  L3 and L3 on L4.      Vertebrae: There is increased T1 marrow signal at L5 into the sacrum.  This raises the question of prior radiation therapy. This is similar  to December 5, 2023 but new when compared to February 11, 2022.  Modic type 1 and 2 degenerative endplate changes are present at  L2-L3. Vertebral body height is maintained.      Discs: There are varying degrees of loss of disc height and disc  desiccation.  The degenerative changes are very similar to December 5, 2023 but progress from February 11, 2022.      Spinal cord: Signal within the spinal cord is normal. The conus  medullaris terminates at L1.      Spinal canal: There is no significant spinal canal narrowing.      L1-L2: There is a mild circumferential disc bulge. There are mild  degenerative changes of the facet joints. There is no significant  lateral recess, neural foramina or spinal canal narrowing.      L2-L3: There is a circumferential disc bulge, ligamentum flavum  buckling and degenerative changes  of the facet joints. There is  increased fluid within the left facet joint attributed to mechanical  strain. Left lateral disc osteophyte complex closely approximates,  may even abuts the proximally exiting left L2 nerve root. There is  mild bilateral neural foraminal narrowing.      L3-L4: There is a circumferential disc bulge, ligamentum flavum  buckling and degenerative changes of the facet joints. There is  increased facet joint fluid attributed to mechanical strain. There is  mild-to-moderate bilateral neural foraminal narrowing.      L4-L5: There is a circumferential disc bulge along with prominent  degenerative changes of the facet joints. There is moderate-to-severe  bilateral neural foraminal narrowing.      L5-S1: There is a mild circumferential disc bulge. There are  prominent degenerative changes of the facet joints. There is  moderate-to-severe bilateral neural foraminal narrowing.      Soft tissues: The paraspinal soft tissues are normal.        Impression    1. There are diffuse degenerative changes of the lumbar spine. This  includes moderate-to-severe bilateral neural foraminal narrowing at  L5-S1.  2. There is increased T1 marrow signal at L5 and into the sacrum.  This raises the question of prior radiation therapy. This is similar  to December 5, 2023 but new when compared to February 11, 2022.  3. Patient is status post L4-L5 and L5-S1 partial left-sided  hemilaminectomies.      MACRO:  None      Signed by: Dakota Lyles 4/16/2025 12:47 PM  Dictation workstation:   [id number]       12:39 PM      Results were successfully communicated with the patient and they acknowledged their understanding.    I reviewed with the patient his lumbar and cervical flexion and extension x-rays from 04/16/2025 that demonstrate degenerative changes with stability on both flexion and extension.  I also reviewed his cervical MRI from 04/16/2025 which demonstrates some mild adjacent segment disease in the central  canal with moderate to severe right foraminal narrowing from C3-C5 which is what is contributing to his symptoms.  He is trialed nonoperative measures and feels he is at the point where he is ready to entertain surgical discussion.  I also reviewed his lumbar MRI from 04/16/2025 which again demonstrates moderate to severe bilateral foraminal narrowing worst at L5-S1.  He again has trialed all nonsurgical options and feels that he is at the point where he is ready to discuss surgical interventions.  To note the MRI of the lumbar demonstrated some questionable radiation therapy like changes.  Patient denies any history of prior radiation therapy but has had multiple interventions in his low back which are visible in the imaging.  Discussed that should an operation be required in his lumbar spine further imaging may be warranted to workup the area.  At this time I am going to order a CT of both the cervical and lumbar regions to better evaluate his bony anatomy and rule out OPLL and DISH as well as underlying fractures.  I have provided him the names of both Dr. Laura and Dr. Morris so he can decide who he would like to see to discuss possible surgical interventions.  He will contact the office once he has decided who he would like to schedule with.  In the meantime he will contact me with any questions or concerns.

## 2025-05-29 ENCOUNTER — TELEMEDICINE (OUTPATIENT)
Dept: PAIN MEDICINE | Facility: CLINIC | Age: 70
End: 2025-05-29
Payer: MEDICARE

## 2025-05-29 DIAGNOSIS — M51.369 DDD (DEGENERATIVE DISC DISEASE), LUMBAR: ICD-10-CM

## 2025-05-29 DIAGNOSIS — M43.06 LUMBAR SPONDYLOLYSIS: ICD-10-CM

## 2025-05-29 DIAGNOSIS — M46.1 BILATERAL SACROILIITIS: Primary | ICD-10-CM

## 2025-05-29 PROCEDURE — 1160F RVW MEDS BY RX/DR IN RCRD: CPT | Performed by: PHYSICIAN ASSISTANT

## 2025-05-29 PROCEDURE — 1159F MED LIST DOCD IN RCRD: CPT | Performed by: PHYSICIAN ASSISTANT

## 2025-05-29 PROCEDURE — 99214 OFFICE O/P EST MOD 30 MIN: CPT | Performed by: PHYSICIAN ASSISTANT

## 2025-05-29 PROCEDURE — 1036F TOBACCO NON-USER: CPT | Performed by: PHYSICIAN ASSISTANT

## 2025-05-29 PROCEDURE — 1125F AMNT PAIN NOTED PAIN PRSNT: CPT | Performed by: PHYSICIAN ASSISTANT

## 2025-05-29 RX ORDER — GABAPENTIN 300 MG/1
300 CAPSULE ORAL 3 TIMES DAILY
Qty: 270 CAPSULE | Refills: 3 | Status: SHIPPED | OUTPATIENT
Start: 2025-05-29

## 2025-05-29 RX ORDER — TIZANIDINE 4 MG/1
4 TABLET ORAL 3 TIMES DAILY PRN
Qty: 270 TABLET | Refills: 0 | Status: SHIPPED | OUTPATIENT
Start: 2025-05-29

## 2025-05-29 ASSESSMENT — PATIENT HEALTH QUESTIONNAIRE - PHQ9
1. LITTLE INTEREST OR PLEASURE IN DOING THINGS: NOT AT ALL
2. FEELING DOWN, DEPRESSED OR HOPELESS: NOT AT ALL
SUM OF ALL RESPONSES TO PHQ9 QUESTIONS 1 & 2: 0

## 2025-05-29 ASSESSMENT — ENCOUNTER SYMPTOMS
ENDOCRINE NEGATIVE: 1
CONSTITUTIONAL NEGATIVE: 1
MYALGIAS: 1
HEMATOLOGIC/LYMPHATIC NEGATIVE: 1
CARDIOVASCULAR NEGATIVE: 1
PSYCHIATRIC NEGATIVE: 1
EYES NEGATIVE: 1
ALLERGIC/IMMUNOLOGIC NEGATIVE: 1
BACK PAIN: 1
RESPIRATORY NEGATIVE: 1
GASTROINTESTINAL NEGATIVE: 1

## 2025-05-29 ASSESSMENT — PAIN DESCRIPTION - DESCRIPTORS: DESCRIPTORS: ACHING

## 2025-05-29 ASSESSMENT — PAIN SCALES - GENERAL
PAINLEVEL_OUTOF10: 8
PAINLEVEL_OUTOF10: 8

## 2025-05-29 ASSESSMENT — LIFESTYLE VARIABLES
HOW MANY STANDARD DRINKS CONTAINING ALCOHOL DO YOU HAVE ON A TYPICAL DAY: PATIENT DOES NOT DRINK
HOW OFTEN DO YOU HAVE SIX OR MORE DRINKS ON ONE OCCASION: NEVER
HOW OFTEN DO YOU HAVE A DRINK CONTAINING ALCOHOL: NEVER
SKIP TO QUESTIONS 9-10: 1
AUDIT-C TOTAL SCORE: 0

## 2025-05-29 ASSESSMENT — PAIN - FUNCTIONAL ASSESSMENT: PAIN_FUNCTIONAL_ASSESSMENT: 0-10

## 2025-05-29 NOTE — PROGRESS NOTES
Subjective   Patient ID: Johnny Epstein is a 69 y.o. male who presents for Back Pain.  Virtual or Telephone Consent    An interactive audio and video telecommunication system which permits real time communications between the patient (at the originating site) and provider (at the distant site) was utilized to provide this telehealth service.   Verbal consent was requested and obtained from Johnny Epstein on this date, 05/29/25 for a telehealth visit and the patient's location was confirmed at the time of the visit.    Patient is a 69-year-old male with DDD's lumbar spondylosis and bilateral sacroiliitis presents today for follow-up.  Patient's last SI joint injections were in November.  Patient notes that he had about 80% relief for approximately 4.5 months.  Patient notes that it is lower SI joint pain he is also having some axial back ache.  In the past she has had bilateral SI joint injections with great efficacy and caudal epidural injections with great efficacy.  He still utilizing his gabapentin and tizanidine as it needed basis.  He is requesting refills on these medications.    Back Pain        Review of Systems   Constitutional: Negative.    HENT: Negative.     Eyes: Negative.    Respiratory: Negative.     Cardiovascular: Negative.    Gastrointestinal: Negative.    Endocrine: Negative.    Genitourinary: Negative.    Musculoskeletal:  Positive for back pain, gait problem and myalgias.   Skin: Negative.    Allergic/Immunologic: Negative.    Hematological: Negative.    Psychiatric/Behavioral: Negative.         Objective   Physical Exam    Assessment/Plan   Problem List Items Addressed This Visit           ICD-10-CM    Bilateral sacroiliitis - Primary M46.1    Relevant Orders    Sacroiliac Joint Injection    FL pain management     Other Visit Diagnoses         Codes      DDD (degenerative disc disease), lumbar     M51.369    Relevant Medications    gabapentin (Neurontin) 300 mg capsule      Lumbar  spondylolysis     M43.06    Relevant Medications    tiZANidine (Zanaflex) 4 mg tablet        Patient's prior SI joint injections providing 80% relief for greater than 4 months.  I think it would be prudent to repeat therefore I am ordering bilateral SI joint injections fluoroscopy guidance local sedation with Dr. DEL TORO at the [facility].    I refilled his gabapentin and tizanidine.  We did talk about appropriate use caution and synergistic effects.  The patient verbalized understanding and he has been stable on these medications without adverse reactions.  [place]ARRS is been reviewed no suspicion of abuse or diversion.         Jeffrey Milton PA-C 05/29/25 8:13 AM

## 2025-06-09 ENCOUNTER — HOSPITAL ENCOUNTER (OUTPATIENT)
Dept: RADIOLOGY | Facility: HOSPITAL | Age: 70
Discharge: HOME | End: 2025-06-09
Payer: MEDICARE

## 2025-06-09 DIAGNOSIS — Z98.1 S/P CERVICAL SPINAL FUSION: ICD-10-CM

## 2025-06-09 DIAGNOSIS — M54.12 CERVICAL RADICULOPATHY: ICD-10-CM

## 2025-06-09 DIAGNOSIS — Z98.890 S/P LAMINECTOMY: ICD-10-CM

## 2025-06-09 DIAGNOSIS — M54.16 LUMBAR RADICULOPATHY: ICD-10-CM

## 2025-06-09 PROCEDURE — 72125 CT NECK SPINE W/O DYE: CPT | Performed by: RADIOLOGY

## 2025-06-09 PROCEDURE — 72125 CT NECK SPINE W/O DYE: CPT

## 2025-06-09 PROCEDURE — 72131 CT LUMBAR SPINE W/O DYE: CPT

## 2025-06-09 PROCEDURE — 72131 CT LUMBAR SPINE W/O DYE: CPT | Performed by: RADIOLOGY

## 2025-06-25 ENCOUNTER — ANCILLARY PROCEDURE (OUTPATIENT)
Dept: RADIOLOGY | Facility: EXTERNAL LOCATION | Age: 70
End: 2025-06-25
Payer: MEDICARE

## 2025-06-25 DIAGNOSIS — M46.1 SACROILIITIS, NOT ELSEWHERE CLASSIFIED: ICD-10-CM

## 2025-06-25 PROCEDURE — 27096 INJECT SACROILIAC JOINT: CPT | Performed by: ANESTHESIOLOGY

## 2025-06-25 NOTE — PROGRESS NOTES
Preprocedure diagnosis: Sacroiliitis  Postprocedure diagnosis sacroiliitis    Procedure performed: Bilateral sacroiliac joint injection    Physician: Cesar Rachel MD    Anesthesia: Local    Complications: none    Blood loss:  none    Clinical note: This is a very pleasant 69-year-old male who suffers with low back pain here meeting all medical criteria for above-mentioned procedure.    Procedure:      The patient was identified in the preoperative area.  The procedure was discussed in detail including its risks, benefits, and alternatives.  Signed consent was obtained and the patient agreed to proceed.       The patient was brought to the [facility] procedure room and positioned herself in the prone position onto the procedure room table.  A pillow was placed below the patient's abdomen to decrease lumbar lordosis and a safety strap was placed across the legs.  Next the left and right sacroiliac joint/s were identified with the aid of fluoroscopy in the anterior-posterior view.  The patient was prepped and draped in the usual sterile fashion with Chlorprep. The skin and subcutaneous tissues were infiltrated with 3 ml of 2% Lidocaine using a 25 gauge 1 1/2 inch needle on each side. A 22 gauge spinal needle was advanced with the aid of fluoroscopy in the anterior-posterior view into the inferior posterior pole of the left and right sacroiliac joint/s. Needle placement was confirmed with the aid of fluoroscopy in the anterior-posterior view. After negative aspiration, 0.5ml of omnipaque contrast was injected under live fluoroscopy into each needle which showed appropriate spread. The left and right SI joint/s was delineated with the aid of fluoroscopy in the anterior-posterior view.     After negative aspiration, 4ml of 0.5% bupivacaine along with 40mg of triamcinolone was injected into each needle     All needles were removed intact. Hemostasis was maintained, and there were no paresthesias or  complications noted. The area was cleaned and a bandage was applied as appropriate.  The patient was then transferred to the recovery area. The procedure was completed without complications and was tolerated well. The patient was monitored after the procedure. The patient (or responsible party) was given post-procedure and discharge instructions to follow at home. The patient was discharged in stable condition. A follow up appointment was made.          Cesar Rachel MD

## 2025-07-21 ENCOUNTER — APPOINTMENT (OUTPATIENT)
Dept: NEUROSURGERY | Facility: CLINIC | Age: 70
End: 2025-07-21
Payer: MEDICARE

## 2025-07-21 VITALS
DIASTOLIC BLOOD PRESSURE: 72 MMHG | HEIGHT: 68 IN | SYSTOLIC BLOOD PRESSURE: 142 MMHG | HEART RATE: 56 BPM | WEIGHT: 163.8 LBS | BODY MASS INDEX: 24.83 KG/M2

## 2025-07-21 DIAGNOSIS — M54.12 CERVICAL RADICULOPATHY: Primary | ICD-10-CM

## 2025-07-21 PROCEDURE — 1036F TOBACCO NON-USER: CPT | Performed by: STUDENT IN AN ORGANIZED HEALTH CARE EDUCATION/TRAINING PROGRAM

## 2025-07-21 PROCEDURE — 1125F AMNT PAIN NOTED PAIN PRSNT: CPT | Performed by: STUDENT IN AN ORGANIZED HEALTH CARE EDUCATION/TRAINING PROGRAM

## 2025-07-21 PROCEDURE — 3077F SYST BP >= 140 MM HG: CPT | Performed by: STUDENT IN AN ORGANIZED HEALTH CARE EDUCATION/TRAINING PROGRAM

## 2025-07-21 PROCEDURE — 99215 OFFICE O/P EST HI 40 MIN: CPT | Performed by: STUDENT IN AN ORGANIZED HEALTH CARE EDUCATION/TRAINING PROGRAM

## 2025-07-21 PROCEDURE — 3008F BODY MASS INDEX DOCD: CPT | Performed by: STUDENT IN AN ORGANIZED HEALTH CARE EDUCATION/TRAINING PROGRAM

## 2025-07-21 PROCEDURE — 3078F DIAST BP <80 MM HG: CPT | Performed by: STUDENT IN AN ORGANIZED HEALTH CARE EDUCATION/TRAINING PROGRAM

## 2025-07-21 ASSESSMENT — PATIENT HEALTH QUESTIONNAIRE - PHQ9
2. FEELING DOWN, DEPRESSED OR HOPELESS: NOT AT ALL
1. LITTLE INTEREST OR PLEASURE IN DOING THINGS: NOT AT ALL
SUM OF ALL RESPONSES TO PHQ9 QUESTIONS 1 AND 2: 0

## 2025-07-21 ASSESSMENT — PAIN SCALES - GENERAL: PAINLEVEL_OUTOF10: 8

## 2025-07-21 NOTE — PROGRESS NOTES
Johnny Epstein is a 69 y.o. year old male with history of C5-6 fusion in 1994 by Dr. Mariano in [place], [facility] (intradiscal electrothermal therapy), L4-5 hemilaminectomy in 2015 at [facility] and 2 more lumbar surgeries at the [facility], multiple right hand surgeries (CTS, trigger point) p/w worsening posterior neck pain with radiation to his R>L shoulder blades. Patient reports that he has had this pain for years but since January it has been worsening. It is currently a 5/10 and starts in his neck and radiates most consistently to his R>L shoulder blade. Intermittently he will have radiation down both his arms but it is not consistent. Denies any numbness/tingling. His low back pain is present but improved with his SI joint injection and it is his neck that bothers him. No significant new weakness in his arms.    14/14 systems reviewed and negative other than what is listed in the history of present illness        Medical History[1]    Surgical History[2]      Current Medications[3]      Vitals:    07/21/25 1101   BP: 142/72   Pulse: 56     Objective   General: Well developed, awake/alert/oriented x3, no distress, alert and cooperative  Skin: Warm and dry, no lesions, no rashes  ENMT: Mucous membranes moist, no apparent injury, no lesions seen  Head/Neck: Neck Supple, no apparent injury  Respiratory/Thorax: Normal breath sounds with good chest expansion, thorax symmetric  Cardiovascular: No pitting edema, no JVD    Motor Strength: 5/5 Throughout all extremities    Muscle Bulk: Normal and symmetric in all extremities    Posture:   -- Cervical: Normal  -- Thoracic: Normal  -- Lumbar : Normal  Paraspinal muscle spasm/tenderness absent.     Sensation: intact to light touch     Relevant Results    4/16/25: There are diffuse degenerative changes cervical spine. This  includes moderate-to-severe neural foraminal narrowing on the right  at C3-C4 and on the right at C4-C5 and at the left at C6-7.     6/9/25 CT CS: showing  posterior lateral facet R 7-1 facet fusion, R 3-4 fusion     Problem List Items Addressed This Visit    None         Assessment/Plan   Problem List Items Addressed This Visit    None    Johnny Epstein is a 69 y.o. year old male patient presenting today with neck pain that goes down to his right shoulder blade. He was doing well until about January 2025 when he started experiencing neck pain radiating into his right shoulder and down his right upper extremity. He states intermittent pain down his right arm, rates it about 5/10 presently. Also notes headaches and ocular issues He has tried physical therapy that helps with pain temporarily but does not provide long term relief. He is able to move around without any interference to his daily activities but is bothered when his pain flares up.     Prior surgeries include: C5-6 fusion without hardware in 1994 at Kaycee Mariano in [facility], [facility] when he underwent an IDET (Intradiscal Electrothermal Therapy) followed by an L4-5 hemilaminectomy in approximately 2015 at [facility] and 2 surgeries at [facility] between the years of 1680-9252 that sound like foraminotomies versus laminectomies with no hardware placement. He has had issues with intubation at his previous surgeries.     In view of his cervical radiculopathy, we discussed the possibility of a C3-T1 PCDF with bilateral foraminotomies. However, he is extremely nervous about surgical intervention due to difficulty with his airway with prior surgeries. We discussed the benefits and risks associated with surgery including risk of anesthesia, bleeding, infection, CSF leak, nerve damage, and pseudoarthrosis. He would like to think about surgery and get back to me regarding whether or not he would like to proceed..     Lorri Morris MD    of Neurosurgery   [facility] [place]   [facility]   Office: [phone]  Fax: [phone]     Scribe Attestation  By  signing my name below, I, Angelita Dale Huang, attest that this documentation has been prepared under the direction and in the presence of Lorri Morris MD. Verbal consent obtained from the patient.           [1]   Past Medical History:  Diagnosis Date    Cervical disc disorder     Chronic pain disorder     Joint pain     Low back pain     Lumbosacral disc disease     Neck pain     Shingles     Thoracic disc disorder    [2]   Past Surgical History:  Procedure Laterality Date    BACK SURGERY      CARPAL TUNNEL RELEASE      CERVICAL FUSION      CT GUIDED PERCUTANEOUS BIOPSY BONE  12/30/2022    CT GUIDED PERCUTANEOUS BIOPSY BONE 12/30/2022    LAMINECTOMY      LUMBAR LAMINECTOMY      NECK SURGERY      SPINE SURGERY     [3]   Current Outpatient Medications:     atorvastatin (Lipitor) 20 mg tablet, once every 24 hours., Disp: , Rfl:     b complex vitamins (Vitamins B Complex) capsule, Take 1 capsule by mouth once daily., Disp: , Rfl:     biotin 5 mg capsule, Take by mouth once daily., Disp: , Rfl:     calcium carbonate-vitamin D3 600 mg-5 mcg (200 unit) tablet, Take 1 tablet by mouth once daily., Disp: , Rfl:     cholecalciferol (Vitamin D-3) 25 mcg (1000 units) tablet, Take by mouth., Disp: , Rfl:     co-enzyme Q-10 30 mg capsule, Take 1 capsule (30 mg) by mouth once daily., Disp: , Rfl:     coenzyme Q10-vitamin E 100-5 mg-unit capsule, once daily., Disp: , Rfl:     diclofenac sodium (Voltaren) 1 % gel gel, APPLY 2 GRAMS TOPICALLY TO THE AFFECTED AREA FOUR TIMES DAILY, Disp: , Rfl:     fluticasone propionate (FLONASE NASL), Flonase, Disp: , Rfl:     gabapentin (Neurontin) 300 mg capsule, Take 1 capsule (300 mg) by mouth 3 times a day., Disp: 270 capsule, Rfl: 3    ibuprofen 600 mg tablet, Take 1 tablet (600 mg) by mouth every 8 hours., Disp: , Rfl:     lisinopriL-hydrochlorothiazide 20-25 mg tablet, Take 1 tablet by mouth once daily in the morning. Take before meals., Disp: , Rfl:     metoprolol succinate XL (Toprol-XL)  25 mg 24 hr tablet, Take 1 tablet (25 mg) by mouth once daily. Do not crush or chew., Disp: , Rfl:     mirabegron (Myrbetriq) 25 mg tablet extended release 24 hr 24 hr tablet, Take 1 tablet (25 mg) by mouth once daily., Disp: , Rfl:     multivit with min-folic acid 0.4 mg tablet, Take 1 tablet by mouth once daily., Disp: , Rfl:     OMEGA-3 FATTY ACIDS ORAL, Take 1 capsule by mouth once daily., Disp: , Rfl:     prednisoLONE acetate (Pred-Forte) 1 % ophthalmic suspension, , Disp: , Rfl:     sildenafil (Viagra) 100 mg tablet, TAKE 1 TABLET BY MOUTH ONCE DAILY AS NEEDED. TAKE 30-60 MINUTES BEFORE SEXUAL ACTIVITY, Disp: , Rfl:     tiZANidine (Zanaflex) 4 mg tablet, Take 1 tablet (4 mg) by mouth 3 times a day as needed for muscle spasms., Disp: 270 tablet, Rfl: 0    valACYclovir (Valtrex) 1 gram tablet, Take 1 tablet (1,000 mg) by mouth 2 times a day., Disp: , Rfl:

## 2025-08-04 ENCOUNTER — TELEPHONE (OUTPATIENT)
Dept: PAIN MEDICINE | Facility: CLINIC | Age: 70
End: 2025-08-04
Payer: MEDICARE

## 2025-08-04 ENCOUNTER — PREP FOR PROCEDURE (OUTPATIENT)
Facility: CLINIC | Age: 70
End: 2025-08-04
Payer: MEDICARE

## 2025-08-04 DIAGNOSIS — M96.1 POSTLAMINECTOMY SYNDROME, LUMBAR REGION: Primary | ICD-10-CM

## 2025-08-27 ENCOUNTER — ANCILLARY PROCEDURE (OUTPATIENT)
Dept: RADIOLOGY | Facility: EXTERNAL LOCATION | Age: 70
End: 2025-08-27
Payer: MEDICARE

## 2025-08-27 DIAGNOSIS — M96.1 POSTLAMINECTOMY SYNDROME, NOT ELSEWHERE CLASSIFIED: ICD-10-CM

## 2025-08-27 PROCEDURE — 62323 NJX INTERLAMINAR LMBR/SAC: CPT | Performed by: ANESTHESIOLOGY
